# Patient Record
Sex: MALE | Race: WHITE | NOT HISPANIC OR LATINO | Employment: OTHER | URBAN - METROPOLITAN AREA
[De-identification: names, ages, dates, MRNs, and addresses within clinical notes are randomized per-mention and may not be internally consistent; named-entity substitution may affect disease eponyms.]

---

## 2017-02-03 ENCOUNTER — PATIENT OUTREACH (OUTPATIENT)
Dept: HEALTH INFORMATION MANAGEMENT | Facility: OTHER | Age: 72
End: 2017-02-03

## 2017-02-04 NOTE — PROGRESS NOTES
2/3/17  -    Outcome:  CONFIRMED WEB IZ.  COMPLETED NEW MEMBER.      Annual Wellness Visit Scheduling  Scheduling Status: Scheduled    Care Gap Scheduling (Attempt to Schedule EACH Overdue Care Gap!)  Health Maintenance Due   Topic Date Due   • IMM DTaP/Tdap/Td Vaccine (1 - Tdap)    • IMM PNEUMOCOCCAL 65+ (ADULT) LOW/MEDIUM RISK SERIES (1 of 2 - PCV13) Pt wants vaccines but would like to speak  with the HC first.         Softfront Activation:  Sent Activation Code

## 2017-02-28 ENCOUNTER — OFFICE VISIT (OUTPATIENT)
Dept: MEDICAL GROUP | Facility: MEDICAL CENTER | Age: 72
End: 2017-02-28
Payer: MEDICARE

## 2017-02-28 VITALS
SYSTOLIC BLOOD PRESSURE: 114 MMHG | DIASTOLIC BLOOD PRESSURE: 84 MMHG | OXYGEN SATURATION: 97 % | HEART RATE: 82 BPM | RESPIRATION RATE: 14 BRPM | HEIGHT: 67 IN | WEIGHT: 187.61 LBS | TEMPERATURE: 97.8 F | BODY MASS INDEX: 29.45 KG/M2

## 2017-02-28 DIAGNOSIS — Z85.46 PERSONAL HISTORY OF PROSTATE CANCER: ICD-10-CM

## 2017-02-28 DIAGNOSIS — M51.9 LUMBAR DISC DISEASE: ICD-10-CM

## 2017-02-28 DIAGNOSIS — R73.02 IGT (IMPAIRED GLUCOSE TOLERANCE): ICD-10-CM

## 2017-02-28 DIAGNOSIS — R60.0 LOCALIZED EDEMA: ICD-10-CM

## 2017-02-28 DIAGNOSIS — Z00.00 HEALTH CARE MAINTENANCE: ICD-10-CM

## 2017-02-28 DIAGNOSIS — Z82.49 FAMILY HISTORY OF CORONARY ARTERY DISEASE: ICD-10-CM

## 2017-02-28 PROCEDURE — 1101F PT FALLS ASSESS-DOCD LE1/YR: CPT | Performed by: FAMILY MEDICINE

## 2017-02-28 PROCEDURE — G8482 FLU IMMUNIZE ORDER/ADMIN: HCPCS | Performed by: FAMILY MEDICINE

## 2017-02-28 PROCEDURE — G8420 CALC BMI NORM PARAMETERS: HCPCS | Performed by: FAMILY MEDICINE

## 2017-02-28 PROCEDURE — 3017F COLORECTAL CA SCREEN DOC REV: CPT | Performed by: FAMILY MEDICINE

## 2017-02-28 PROCEDURE — 4040F PNEUMOC VAC/ADMIN/RCVD: CPT | Mod: 8P | Performed by: FAMILY MEDICINE

## 2017-02-28 PROCEDURE — 4004F PT TOBACCO SCREEN RCVD TLK: CPT | Performed by: FAMILY MEDICINE

## 2017-02-28 PROCEDURE — 99204 OFFICE O/P NEW MOD 45 MIN: CPT | Mod: 25 | Performed by: FAMILY MEDICINE

## 2017-02-28 RX ORDER — POTASSIUM CHLORIDE 600 MG/1
8 CAPSULE, EXTENDED RELEASE ORAL 2 TIMES DAILY
Qty: 180 CAP | Refills: 3 | Status: SHIPPED | OUTPATIENT
Start: 2017-02-28 | End: 2018-02-16 | Stop reason: SDUPTHER

## 2017-02-28 RX ORDER — FUROSEMIDE 20 MG/1
20 TABLET ORAL
Qty: 90 TAB | Refills: 3 | Status: SHIPPED | OUTPATIENT
Start: 2017-02-28 | End: 2018-02-16 | Stop reason: SDUPTHER

## 2017-02-28 NOTE — MR AVS SNAPSHOT
"        Primo Abarca   2017 10:20 AM   Office Visit   MRN: [id number]    Department:  [address] [place] [address]   Dept Phone:  [phone]    Description:  Male : 1945   Provider:  Skylar Evans M.D.           Reason for Visit     Establish Care med refills      Allergies as of 2017     Allergen Noted Reactions    Pcn [Penicillins] 2013         You were diagnosed with     Localized edema   [2017]       Personal history of prostate cancer   [475013]       IGT (impaired glucose tolerance)   [234001]       Lumbar disc disease   [462199]       Health care maintenance   [783725]       Family history of coronary artery disease   [606338]         Vital Signs     Blood Pressure Pulse Temperature Respirations Height Weight    114/84 mmHg 82 36.6 °C (97.8 °F) 14 1.702 m (5' 7\") 85.1 kg (187 lb 9.8 oz)    Body Mass Index Oxygen Saturation Smoking Status             29.38 kg/m2 97% Current Every Day Smoker         Basic Information     Date Of Birth Sex Race Ethnicity Preferred Language    1945 Male White Non- English      Your appointments     2017  2:00 PM   ANNUAL WELLNESS with Skylar Evans M.D., MATTHEW [facility]    [place] [address] [place] [facility])    [address] [place] [address] [address] [place] [id number]   [phone]              Problem List              ICD-10-CM Priority Class Noted - Resolved    Personal history of prostate cancer Z85.46   2013 - Present    Edema R60.9   3/31/2015 - Present    Lumbar disc disease M51.9   3/31/2015 - Present    Impaired fasting glucose R73.01   2015 - Present    Family history of coronary artery disease Z82.49   2015 - Present      Health Maintenance        Date Due Completion Dates    IMM DTaP/Tdap/Td Vaccine (1 - Tdap) 10/10/1962 ---    IMM PNEUMOCOCCAL 65+ (ADULT) LOW/MEDIUM RISK SERIES (1 of 2 - PCV13) 10/10/2008 ---    COLONOSCOPY 5/3/2021 5/3/2011 (Done)    Override on 5/3/2011: Done            "   Current Immunizations     Influenza Vaccine Adult HD 10/17/2016  2:59 PM, 11/6/2015    Influenza Vaccine Quad Inj (Pf) 10/1/2014, 11/16/2012    Influenza Vaccine Quad Inj (Preserved) 10/1/2013, 10/18/2011, 10/28/2010    SHINGLES VACCINE 8/1/2013      Below and/or attached are the medications your provider expects you to take. Review all of your home medications and newly ordered medications with your provider and/or pharmacist. Follow medication instructions as directed by your provider and/or pharmacist. Please keep your medication list with you and share with your provider. Update the information when medications are discontinued, doses are changed, or new medications (including over-the-counter products) are added; and carry medication information at all times in the event of emergency situations     Allergies:  PCN - (reactions not documented)               Medications  Valid as of: February 28, 2017 - 10:51 AM    Generic Name Brand Name Tablet Size Instructions for use    Cholecalciferol (Tab) vitamin D 2000 UNIT Take  by mouth every day.        Furosemide (Tab) LASIX 20 MG Take 1 Tab by mouth every day.        Oxycodone-Acetaminophen (Tab) PERCOCET 5-325 MG Take 1 Tab by mouth 4 times a day as needed.        Potassium Chloride (Cap CR) MICRO-K 8 MEQ Take 1 Cap by mouth 2 Times a Day.        Sulfamethoxazole-Trimethoprim (Tab) BACTRIM -160 MG Take 1 Tab by mouth 2 times a day.        .                 Medicines prescribed today were sent to:     [facility] [id number] [place] [address] [address] [facility] [address] [facility] [id number]    Phone: [phone] Fax: [phone]    Open 24 Hours?: No      Medication refill instructions:       If your prescription bottle indicates you have medication refills left, it is not necessary to call your provider’s office. Please contact your pharmacy and they will refill your medication.    If your prescription bottle indicates  you do not have any refills left, you may request refills at any time through one of the following ways: The online Cellvine system (except Urgent Care), by calling your provider’s office, or by asking your pharmacy to contact your provider’s office with a refill request. Medication refills are processed only during regular business hours and may not be available until the next business day. Your provider may request additional information or to have a follow-up visit with you prior to refilling your medication.   *Please Note: Medication refills are assigned a new Rx number when refilled electronically. Your pharmacy may indicate that no refills were authorized even though a new prescription for the same medication is available at the pharmacy. Please request the medicine by name with the pharmacy before contacting your provider for a refill.        Your To Do List     Future Labs/Procedures Complete By Expires    CBC WITHOUT DIFFERENTIAL  As directed 8/31/2017    COMP METABOLIC PANEL  As directed 2/28/2018    TSH  As directed 3/1/2018         Cellvine Access Code: [email or web address]  Expires: 3/5/2017  4:29 PM    Cellvine  A secure, online tool to manage your health information     [facility]’s Cellvine® is a secure, online tool that connects you to your personalized health information from the privacy of your home -- day or night - making it very easy for you to manage your healthcare. Once the activation process is completed, you can even access your medical information using the Cellvine mei, which is available for free in the Apple Mei store or Google Play store.     Cellvine provides the following levels of access (as shown below):   My Chart Features   [facility]  Specialists [facility]  Urgent  Care Non-[facility]  Primary Care  Doctor   Email your healthcare team securely and privately 24/7 X X X    Manage appointments: schedule your next appointment; view details of past/upcoming appointments X       Request prescription refills. X      View recent personal medical records, including lab and immunizations X X X X   View health record, including health history, allergies, medications X X X X   Read reports about your outpatient visits, procedures, consult and ER notes X X X X   See your discharge summary, which is a recap of your hospital and/or ER visit that includes your diagnosis, lab results, and care plan. X X       How to register for [facility] [employer]:  1. Go to  [email or web address].  2. Click on the Sign Up Now box, which takes you to the New Member Sign Up page. You will need to provide the following information:  a. Enter your [facility] [facility] Access Code exactly as it appears at the top of this page. (You will not need to use this code after you’ve completed the sign-up process. If you do not sign up before the expiration date, you must request a new code.)   b. Enter your date of birth.   c. Enter your home email address.   d. Click Submit, and follow the next screen’s instructions.  3. Create a [employer] Group ID. This will be your [employer] login ID and cannot be changed, so think of one that is secure and easy to remember.  4. Create a [employer] password. You can change your password at any time.  5. Enter your Password Reset Question and Answer. This can be used at a later time if you forget your password.   6. Enter your e-mail address. This allows you to receive e-mail notifications when new information is available in [employer].  7. Click Sign Up. You can now view your health information.    For assistance activating your [employer] account, call [phone]

## 2017-02-28 NOTE — PROGRESS NOTES
CC: Establish a new PCP    HPI:  Primo presents today to establish a new primary care relationship. Was a patient of Dr Chávez.    Lives alone, active with all ADLs. Has the following medical issues:    Has been having a chronic swelling of both lower extremities, no shortness of breath, denies any h/o heart , or lunf diseases. But has FHx of CAD. Has been using the compression socks.  Has been on lasix, and potassium.    Has history of prostate cancer, was diagnosed 3 years ago, s/p tumour removal (surgery).Has been asymptomatic since then( no hematuria, no urinary symptoms)    Impaired glucose tolerance, his last A1C was 6.3.Denies excessive urination, and drinking of water.Patient is counseled about life style modification ( low carb diet, and age appropriate exercise).    Has h/o lumbar disc disease, has been stable, and mostly asymptomatic. Uses otc pain meds as needed.    Flu vaccine in UTD. Will discuss pneumonia vaccine next visit.        Patient Active Problem List    Diagnosis Date Noted   • Family history of coronary artery disease 05/04/2015   • Impaired fasting glucose 04/09/2015   • Edema 03/31/2015   • Lumbar disc disease 03/31/2015   • Personal history of prostate cancer 07/30/2013       Current Outpatient Prescriptions   Medication Sig Dispense Refill   • furosemide (LASIX) 20 MG Tab Take 1 Tab by mouth every day. 90 Tab 3   • Potassium Chloride CR (MICRO-K) 8 MEQ Cap CR capsule Take 1 Cap by mouth 2 Times a Day. 180 Cap 3   • Cholecalciferol (VITAMIN D) 2000 UNIT TABS Take  by mouth every day.     • sulfamethoxazole-trimethoprim (BACTRIM DS, SEPTRA DS) 800-160 MG per tablet Take 1 Tab by mouth 2 times a day. 20 Tab 0   • oxycodone-acetaminophen (PERCOCET) 5-325 MG TABS Take 1 Tab by mouth 4 times a day as needed. 60 Tab 0     No current facility-administered medications for this visit.         Allergies as of 02/28/2017 - Kenneth as Reviewed 05/20/2016   Allergen Reaction Noted   • Pcn  "[penicillins]  07/23/2013        Social History     Social History   • Marital Status: Single     Spouse Name: N/A   • Number of Children: N/A   • Years of Education: N/A     Occupational History   • Not on file.     Social History Main Topics   • Smoking status: Current Every Day Smoker -- 2.00 packs/day for 45 years     Types: Pipe   • Smokeless tobacco: Never Used   • Alcohol Use: No   • Drug Use: No   • Sexual Activity: No     Other Topics Concern   • Not on file     Social History Narrative       Family History   Problem Relation Age of Onset   • Cancer Mother    • Heart Disease Father    • Cancer Sister        Past Surgical History   Procedure Laterality Date   • Cataract phaco with iol  2006     robert eyes   • Other     • Node dissection  7/30/2013     Performed by Nain Mckeon M.D. at SUR[facility]ERY [facility]   • Prostatectomy, radical retro  7/30/2013     radical prostatectomy performed by Nain Mckeon M.D. at SURGERY [facility]       ROS:  Denies any Headache, Blurred Vision, Confusion Chest pain,  Shortness of breath,  Abdominal pain, Changes of bowel or bladder, Lower ext edema, Fevers, Nights sweats, Weight Changes, Focal weakness or numbness.  All other systems are negative.    /84 mmHg  Pulse 82  Temp(Src) 36.6 °C (97.8 °F)  Resp 14  Ht 1.702 m (5' 7\")  Wt 85.1 kg (187 lb 9.8 oz)  BMI 29.38 kg/m2  SpO2 97%    Physical Exam:  Gen:         Alert and oriented, No apparent distress.  HEENT:   Perrla, TM clear,  Oralpharynx no erythema or exudates.  Neck:       No Jugular venous distension, Lymphadenopathy, Thyromegaly, Bruits.  Lungs:     Clear to auscultation bilaterally  CV:          Regular rate and rhythm. No murmurs, rubs or gallops.  Abd:         Soft non tender, non distended. Normal active bowel sounds. No hepatosplenomegaly, No pulsatile masses.  Ext:          No clubbing, cyanosis, bilateral leg edema, on compression socks.      Assessment and Plan.   71 y.o. male     1. " Localized edema of both lower extremities  Probably venous insufficiency.No h/o heart diease, no SOB.  Continue using the compression socks.  Continue on lasix, and potassium.    - furosemide (LASIX) 20 MG Tab; Take 1 Tab by mouth every day.  Dispense: 90 Tab; Refill: 3  - Potassium Chloride CR (MICRO-K) 8 MEQ Cap CR capsule; Take 1 Cap by mouth 2 Times a Day.  Dispense: 180 Cap; Refill: 3    2. Personal history of prostate cancer  Diagnosed 3 years ago, s/p tumour removal 9 surgery).  Has been asymptomatic.  Will check PSA.    - PROSTATE SPECIFIC AG    3. IGT (impaired glucose tolerance)  Last A1C was 6.3.  Patient is counseled about life style modification ( low carb diet, and age appropriate exercise).    - LIPID PANEL  - COMP METABOLIC PANEL; Future  - CBC WITHOUT DIFFERENTIAL; Future    4. Lumbar disc disease  Pain has been controlled on otc pain meds.    5. Health care maintenance  Flu vaccine in UTD. Will discuss pneumonia vaccine next visit.    6. Family history of coronary artery disease    - LIPID PANEL  - COMP METABOLIC PANEL; Future  - PROSTATE SPECIFIC AG  - CBC WITHOUT DIFFERENTIAL; Future  - TSH; Future

## 2017-03-29 NOTE — PROGRESS NOTES
Attempt #:1    Verify PCP: yes    Communication Preference Obtained: yes     Review Care Team: yes    Annual Wellness Visit Scheduling  1. Scheduling Status:Scheduled          Care Gap Scheduling (Attempt to Schedule EACH Overdue Care Gap!)     Health Maintenance Due   Topic Date Due   • Annual Wellness Visit  SCHEDULED    • IMM DTaP/Tdap/Td Vaccine (1 - Tdap) SCHEDULED   • IMM PNEUMOCOCCAL 65+ (ADULT) LOW/MEDIUM RISK SERIES (1 of 2 - PCV13) SCHEDULED         MyChart Activation: sent activation code  Dobango Mei: no  Virtual Visits: no  Opt In to Text Messages: no

## 2017-11-29 ENCOUNTER — TELEPHONE (OUTPATIENT)
Dept: MEDICAL GROUP | Facility: MEDICAL CENTER | Age: 72
End: 2017-11-29

## 2017-11-29 DIAGNOSIS — D64.9 ANEMIA, UNSPECIFIED TYPE: ICD-10-CM

## 2017-12-13 ENCOUNTER — HOSPITAL ENCOUNTER (OUTPATIENT)
Dept: LAB | Facility: MEDICAL CENTER | Age: 72
End: 2017-12-13
Attending: FAMILY MEDICINE
Payer: MEDICARE

## 2017-12-13 DIAGNOSIS — R73.02 IGT (IMPAIRED GLUCOSE TOLERANCE): ICD-10-CM

## 2017-12-13 DIAGNOSIS — D64.9 ANEMIA, UNSPECIFIED TYPE: ICD-10-CM

## 2017-12-13 DIAGNOSIS — Z82.49 FAMILY HISTORY OF CORONARY ARTERY DISEASE: ICD-10-CM

## 2017-12-13 LAB
ALBUMIN SERPL BCP-MCNC: 3.9 G/DL (ref 3.2–4.9)
ALBUMIN/GLOB SERPL: 1.6 G/DL
ALP SERPL-CCNC: 61 U/L (ref 30–99)
ALT SERPL-CCNC: 14 U/L (ref 2–50)
ANION GAP SERPL CALC-SCNC: 9 MMOL/L (ref 0–11.9)
AST SERPL-CCNC: 15 U/L (ref 12–45)
BASOPHILS # BLD AUTO: 0.6 % (ref 0–1.8)
BASOPHILS # BLD: 0.05 K/UL (ref 0–0.12)
BILIRUB SERPL-MCNC: 0.6 MG/DL (ref 0.1–1.5)
BUN SERPL-MCNC: 19 MG/DL (ref 8–22)
CALCIUM SERPL-MCNC: 8.7 MG/DL (ref 8.5–10.5)
CHLORIDE SERPL-SCNC: 100 MMOL/L (ref 96–112)
CHOLEST SERPL-MCNC: 119 MG/DL (ref 100–199)
CO2 SERPL-SCNC: 25 MMOL/L (ref 20–33)
CREAT SERPL-MCNC: 0.76 MG/DL (ref 0.5–1.4)
EOSINOPHIL # BLD AUTO: 0.06 K/UL (ref 0–0.51)
EOSINOPHIL NFR BLD: 0.7 % (ref 0–6.9)
ERYTHROCYTE [DISTWIDTH] IN BLOOD BY AUTOMATED COUNT: 41.4 FL (ref 35.9–50)
GFR SERPL CREATININE-BSD FRML MDRD: >60 ML/MIN/1.73 M 2
GLOBULIN SER CALC-MCNC: 2.5 G/DL (ref 1.9–3.5)
GLUCOSE SERPL-MCNC: 98 MG/DL (ref 65–99)
HCT VFR BLD AUTO: 45.4 % (ref 42–52)
HDLC SERPL-MCNC: 47 MG/DL
HGB BLD-MCNC: 15.1 G/DL (ref 14–18)
IMM GRANULOCYTES # BLD AUTO: 0.09 K/UL (ref 0–0.11)
IMM GRANULOCYTES NFR BLD AUTO: 1.1 % (ref 0–0.9)
LDLC SERPL CALC-MCNC: 60 MG/DL
LYMPHOCYTES # BLD AUTO: 1.02 K/UL (ref 1–4.8)
LYMPHOCYTES NFR BLD: 12.6 % (ref 22–41)
MCH RBC QN AUTO: 30.7 PG (ref 27–33)
MCHC RBC AUTO-ENTMCNC: 33.3 G/DL (ref 33.7–35.3)
MCV RBC AUTO: 92.3 FL (ref 81.4–97.8)
MONOCYTES # BLD AUTO: 0.74 K/UL (ref 0–0.85)
MONOCYTES NFR BLD AUTO: 9.2 % (ref 0–13.4)
NEUTROPHILS # BLD AUTO: 6.12 K/UL (ref 1.82–7.42)
NEUTROPHILS NFR BLD: 75.8 % (ref 44–72)
NRBC # BLD AUTO: 0 K/UL
NRBC BLD AUTO-RTO: 0 /100 WBC
PLATELET # BLD AUTO: 205 K/UL (ref 164–446)
PMV BLD AUTO: 11.7 FL (ref 9–12.9)
POTASSIUM SERPL-SCNC: 3.9 MMOL/L (ref 3.6–5.5)
PROT SERPL-MCNC: 6.4 G/DL (ref 6–8.2)
PSA SERPL-MCNC: <0.01 NG/ML (ref 0–4)
RBC # BLD AUTO: 4.92 M/UL (ref 4.7–6.1)
SODIUM SERPL-SCNC: 134 MMOL/L (ref 135–145)
TRIGL SERPL-MCNC: 61 MG/DL (ref 0–149)
TSH SERPL DL<=0.005 MIU/L-ACNC: 2.04 UIU/ML (ref 0.3–3.7)
WBC # BLD AUTO: 8.1 K/UL (ref 4.8–10.8)

## 2017-12-13 PROCEDURE — 80061 LIPID PANEL: CPT

## 2017-12-13 PROCEDURE — 85025 COMPLETE CBC W/AUTO DIFF WBC: CPT

## 2017-12-13 PROCEDURE — 84153 ASSAY OF PSA TOTAL: CPT

## 2017-12-13 PROCEDURE — 80053 COMPREHEN METABOLIC PANEL: CPT

## 2017-12-13 PROCEDURE — 84443 ASSAY THYROID STIM HORMONE: CPT

## 2017-12-13 PROCEDURE — 36415 COLL VENOUS BLD VENIPUNCTURE: CPT

## 2017-12-29 ENCOUNTER — TELEPHONE (OUTPATIENT)
Dept: MEDICAL GROUP | Facility: MEDICAL CENTER | Age: 72
End: 2017-12-29

## 2017-12-29 NOTE — TELEPHONE ENCOUNTER
PVP WITH OUTREACH  Future Appointments       Provider Department Center    1/2/2018 11:00 AM Skylar Evans M.D.; [place] [facility]  [facility] [address]          ORIGINAL PVP AWV // PVP WITHOUT OUTREACH  ANNUAL WELLNESS VISIT PRE-VISIT PLANNING     1.  Reviewed note from last office visit with PCP: YES 02/28/17    2.  If any orders were placed at last visit, do we have Results/Consult Notes?        •  Labs - Labs ordered, completed on 12/13/17 and results are in chart.       •  Imaging - Imaging was not ordered at last office visit.       •  Referrals - No referrals were ordered at last office visit.    3.  Immunizations were updated in [employer] using WebIZ?: Yes       •  WebIZ Recommendations: PREVNAR (PCV13)  and TDAP       •  Is patient due for Tdap? YES. Patient was notified of copay/out of pocket cost.       •  Is patient due for Shingles? NO     4.  Patient is due for the following Health Maintenance Topics:   Health Maintenance Due   Topic Date Due   • Annual Wellness Visit  1945   • IMM DTaP/Tdap/Td Vaccine (1 - Tdap) 10/10/1962       - Patient already has appointment scheduled for Immunizations: TDAP.      5.  Reviewed/Updated the following with patient:       •   Preferred Pharmacy? YES       •   Preferred Lab? YES       •   Preferred Communication? YES       •   Allergies? YES       •   Medications? YES. Was Abstract Encounter opened and chart updated? YES       •   Social History? YES. Was Abstract Encounter opened and chart updated? YES       •   Family History (document living status of immediate family members and if + hx of cancer, diabetes, hypertension, hyperlipidemia, heart attack, stroke) YES. Was Abstract Encounter opened and chart updated? YES    6.  Care Team Updated:       •   DME Company (gait device, O2, CPAP, etc.): YES       •   Other Specialists (eye doctor, derm, GYN, cardiology, endo, etc): YES    7.  Patient has the following Care Path diagnoses on  Problem List:  NONE    8.  Specialty Comments was updated with diagnosis information provided by SCP: YES    9.  Patient was advised: “This is a free wellness visit. The provider will screen for medical conditions to help you stay healthy. If you have other concerns to address you may be asked to discuss these at a separate visit or there may be an additional fee.”     6.  Patient was informed to arrive 15 min prior to their scheduled appointment and bring in their medication bottles.

## 2018-01-02 ENCOUNTER — OFFICE VISIT (OUTPATIENT)
Dept: MEDICAL GROUP | Facility: MEDICAL CENTER | Age: 73
End: 2018-01-02
Payer: MEDICARE

## 2018-01-02 VITALS
HEART RATE: 68 BPM | SYSTOLIC BLOOD PRESSURE: 122 MMHG | BODY MASS INDEX: 29.25 KG/M2 | HEIGHT: 66 IN | WEIGHT: 182 LBS | TEMPERATURE: 97.8 F | RESPIRATION RATE: 16 BRPM | OXYGEN SATURATION: 97 % | DIASTOLIC BLOOD PRESSURE: 70 MMHG

## 2018-01-02 DIAGNOSIS — R73.01 IMPAIRED FASTING GLUCOSE: ICD-10-CM

## 2018-01-02 DIAGNOSIS — Z85.46 PERSONAL HISTORY OF PROSTATE CANCER: ICD-10-CM

## 2018-01-02 DIAGNOSIS — M51.9 LUMBAR DISC DISEASE: ICD-10-CM

## 2018-01-02 DIAGNOSIS — Z23 NEED FOR TDAP VACCINATION: ICD-10-CM

## 2018-01-02 DIAGNOSIS — Z00.00 MEDICARE ANNUAL WELLNESS VISIT, INITIAL: ICD-10-CM

## 2018-01-02 DIAGNOSIS — M79.89 LEG SWELLING: ICD-10-CM

## 2018-01-02 DIAGNOSIS — Z91.81 RISK FOR FALLS: ICD-10-CM

## 2018-01-02 DIAGNOSIS — R25.1 TREMORS OF NERVOUS SYSTEM: ICD-10-CM

## 2018-01-02 PROCEDURE — 99999 PR NO CHARGE: CPT | Performed by: FAMILY MEDICINE

## 2018-01-02 PROCEDURE — 90715 TDAP VACCINE 7 YRS/> IM: CPT | Performed by: FAMILY MEDICINE

## 2018-01-02 PROCEDURE — 90471 IMMUNIZATION ADMIN: CPT | Performed by: FAMILY MEDICINE

## 2018-01-02 PROCEDURE — G0439 PPPS, SUBSEQ VISIT: HCPCS | Mod: 25 | Performed by: FAMILY MEDICINE

## 2018-01-02 ASSESSMENT — PATIENT HEALTH QUESTIONNAIRE - PHQ9: CLINICAL INTERPRETATION OF PHQ2 SCORE: 0

## 2018-01-02 ASSESSMENT — PAIN SCALES - GENERAL: PAINLEVEL: NO PAIN

## 2018-01-02 NOTE — PROGRESS NOTES
Chief Complaint   Patient presents with   • Annual Wellness Visit         HPI:  Primo is a 72 y.o. here for Medicare Annual Wellness Visit       Impaired fasting glucose  Her A1C one year ago was 6.3. However most recent Blood glucose was normal. Patient has been eating low carb diet. Has been asymptomatic,    Lumbar disc disease  Tolerable, otc pain medication has been working.    Personal history of prostate cancer  Underwent prostatectomy 5 yrs ago. In remission, asymptomatic.. Most recent PSA was normal.    Leg swelling  Has been having a lymphedema as a result of the radiacal prostatectomy for prostate cancer.Has been on lasix since then. .    Tremors of nervous system  New symptoms. Started a year ago. It has been associated with low voice and slowness of movements.    Risk for falls  Has slowness of movements, possibly due to parkinson disease. Denies falls, and gait problem. He does not want to use cane or a walker.    Medicare annual wellness visit, initial  Preventive, and chronic medical issues were reviewed.    Due for the Tdap.      Patient Active Problem List    Diagnosis Date Noted   • Family history of coronary artery disease 05/04/2015   • Impaired fasting glucose 04/09/2015   • Edema 03/31/2015   • Lumbar disc disease 03/31/2015   • Personal history of prostate cancer 07/30/2013       Current Outpatient Prescriptions   Medication Sig Dispense Refill   • furosemide (LASIX) 20 MG Tab Take 1 Tab by mouth every day. 90 Tab 3   • Potassium Chloride CR (MICRO-K) 8 MEQ Cap CR capsule Take 1 Cap by mouth 2 Times a Day. 180 Cap 3   • sulfamethoxazole-trimethoprim (BACTRIM DS, SEPTRA DS) 800-160 MG per tablet Take 1 Tab by mouth 2 times a day. (Patient not taking: Reported on 12/29/2017) 20 Tab 0   • Cholecalciferol (VITAMIN D) 2000 UNIT TABS Take 4,000 Units by mouth every day.     • oxycodone-acetaminophen (PERCOCET) 5-325 MG TABS Take 1 Tab by mouth 4 times a day as needed. (Patient not taking: Reported  on 12/29/2017) 60 Tab 0     No current facility-administered medications for this visit.         The patient is not taking Oxycodone and Bactrim medication(s) due to:  other: oxy was for after sugery and PT does not remember the Bactrim.  Current supplements as per medication list.     Allergies: Pcn [penicillins]    Current social contact/activities: Visit with family and friends, dinner with with old coworker occasionally      Is patient current with immunizations? No, due for TDAP. Patient is interested in receiving TDAP today.    He  reports that he has been smoking Pipe.  He has a 100.00 pack-year smoking history. He has never used smokeless tobacco. He reports that he does not drink alcohol or use drugs.  Ready to quit: Not Answered  Counseling given: Not Answered        DPA/Advanced directive: Patient does not have an Advanced Directive.  A packet and workshop information was given on Advanced Directives.    ROS:    Gait: Uses no assistive device   Ostomy: no   Other tubes: no   Amputations: no   Chronic oxygen use no   Last eye exam 6/2017   Wears hearing aids: no   : Reports urinary leakage during the last 6 months that has somewhat interfered with their daily activities or sleep.     Screening:        Depression Screening    Little interest or pleasure in doing things?  0 - not at all  Feeling down, depressed, or hopeless? 0 - not at all  Patient Health Questionnaire Score: 0    If depressive symptoms identified deferred to follow up visit unless specifically addressed in assessment and plan.    Interpretation of PHQ-9 Total Score   Score Severity   1-4 No Depression   5-9 Mild Depression   10-14 Moderate Depression   15-19 Moderately Severe Depression   20-27 Severe Depression    Screening for Cognitive Impairment    Three Minute Recall (apple, watch, aldo)  2/3 Village, Kitchen, Baby  Draw clock face with all 12 numbers set to the hand to show 40 minutes past 3 o'clock  pass 4/5  If cognitive concerns  identified, deferred for follow up unless specifically addressed in assessment and plan.    Fall Risk Assessment    Has the patient had two or more falls in the last year or any fall with injury in the last year?  Yes  If fall risk identified, deferred for follow up unless specifically addressed in assessment and plan.    Safety Assessment    Throw rugs on floor.  No  Handrails on all stairs.  Yes  Good lighting in all hallways.  Yes  Difficulty hearing.  No  Patient counseled about all safety risks that were identified.    Functional Assessment ADLs    Are there any barriers preventing you from cooking for yourself or meeting nutritional needs?  No.    Are there any barriers preventing you from driving safely or obtaining transportation?  No.    Are there any barriers preventing you from using a telephone or calling for help?  No.    Are there any barriers preventing you from shopping?  No.    Are there any barriers preventing you from taking care of your own finances?  No.    Are there any barriers preventing you from managing your medications?  No.    Are you currently engaging any exercise or physical activity?  Yes.  Walking 1-3 miles 3x per wk    Health Maintenance Summary                Annual Wellness Visit Overdue 1945     IMM DTaP/Tdap/Td Vaccine Overdue 10/10/1962     IMM PNEUMOCOCCAL 65+ (ADULT) LOW/MEDIUM RISK SERIES Next Due 10/5/2018      Done 10/5/2017 Imm Admin: Pneumococcal Conjugate Vaccine (Prevnar/PCV-13)    COLONOSCOPY Next Due 5/3/2021      Done 5/3/2011           Patient Care Team:  Skylar Evans M.D. as PCP - General (Geriatrics)  Nain Mckeon M.D. as Consulting Physician (Urology)    Social History   Substance Use Topics   • Smoking status: Current Every Day Smoker     Packs/day: 2.00     Years: 50.00     Types: Pipe   • Smokeless tobacco: Never Used      Comment: Started smoking at age 23   • Alcohol use No     Family History   Problem Relation Age of Onset   • Breast Cancer  "Mother    • Heart Disease Father      bypass   • Hypertension Father    • Heart Failure Father    • Diabetes Sister    • No Known Problems Brother    • No Known Problems Maternal Grandmother    • Stroke Paternal Grandmother    • No Known Problems Brother    • No Known Problems Brother    • No Known Problems Brother    • No Known Problems Brother      He  has a past medical history of Cancer (CMS-HCC) (2013).   Past Surgical History:   Procedure Laterality Date   • NODE DISSECTION  7/30/2013    Performed by Nain Mckeon M.D. at SURGERY [facility]   • PROSTATECTOMY, RADICAL RETRO  7/30/2013    radical prostatectomy performed by Nain Mckeon M.D. at SURGERY [facility]   • CATARACT PHACO WITH IOL  2006    robert eyes   • OTHER         Exam:     Blood pressure 122/70, pulse 68, temperature 36.6 °C (97.8 °F), resp. rate 16, height 1.67 m (5' 5.75\"), weight 82.6 kg (182 lb), SpO2 97 %. Body mass index is 29.6 kg/m².    Hearing , guzman  Dentition , good.  Alert, oriented in no acute distress.  Eye contact is good, speech goal directed, affect calm  Tremors: has a pill rolling tremors pf the right hand, and hypophonia, no stiffness, or rigidity.    Assessment and Plan. The following treatment and monitoring plan is recommended:    72 y.o. male     1. Need for Tdap vaccination  Given today.    - TDAP VACCINE =>8YO IM; Future  - Initial Wellness Visit - Includes PPPS ()    2. Impaired fasting glucose  A1C one year ago was 6.3. However most recent Blood glucose was normal. Patient has been eating low carb diet.    - Initial Wellness Visit - Includes PPPS ()    3. Lumbar disc disease  Tolerable, otc pain medication has been working.    - Initial Wellness Visit - Includes PPPS ()    4. Personal history of prostate cancer  S/P prostatectomy 5 yrs ago. In remission. Most recent PSA was normal.    - Initial Wellness Visit - Includes PPPS ()    5. Leg swelling  Lymphedema as a result of the prostatectomy " ( radiacal prostatectomy).  Has been on lasix .    - Initial Wellness Visit - Includes PPPS ()    6. Tremors of nervous system  For one year, associated with low voice and slowness of movements.  Possibly parkinson disease.  Will send to neurology.    - Initial Wellness Visit - Includes PPPS ()  - REFERRAL TO NEUROLOGY    7. Risk for falls  Has slowness of movements, possibly due to parkinson disease. Denies falls, and gait problem. He does not want to use cane or a walker.    - Patient identified as fall risk.  Appropriate orders and counseling given.    8. Medicare annual wellness visit, initial  Preventive, and chronic medical issues were reviewed.    - Initial Wellness Visit - Includes PPPS ()      Services suggested:   Health Care Screening recommendations as per orders if indicated.  Referrals offered: PT/OT/Nutrition counseling/Behavioral Health/Smoking cessation as per orders if indicated.    Discussion today about general wellness and lifestyle habits:    · Prevent falls and reduce trip hazards; Cautioned about securing or removing rugs.  · Have a working fire alarm and carbon monoxide detector;   · Engage in regular physical activity and social activities       Follow-up: No Follow-up on file.

## 2018-02-16 DIAGNOSIS — R60.0 LOCALIZED EDEMA: ICD-10-CM

## 2018-02-16 RX ORDER — FUROSEMIDE 20 MG/1
TABLET ORAL
Qty: 90 TAB | Refills: 0 | Status: SHIPPED | OUTPATIENT
Start: 2018-02-16 | End: 2018-05-14 | Stop reason: SDUPTHER

## 2018-02-16 RX ORDER — POTASSIUM CHLORIDE 600 MG/1
CAPSULE, EXTENDED RELEASE ORAL
Qty: 180 CAP | Refills: 0 | Status: SHIPPED | OUTPATIENT
Start: 2018-02-16 | End: 2018-05-14 | Stop reason: SDUPTHER

## 2018-03-01 ENCOUNTER — PATIENT OUTREACH (OUTPATIENT)
Dept: HEALTH INFORMATION MANAGEMENT | Facility: OTHER | Age: 73
End: 2018-03-01

## 2018-03-01 NOTE — PROGRESS NOTES
Attempt #:1    WebIZ Checked & Epic Updated: no  HealthConnect Verified: no  Verify PCP: yes    Communication Preference Obtained: yes     Review Care Team: yes    Annual Wellness Visit Scheduling  1. Scheduling Status:Not Scheduled. Patient states they are not interested

## 2018-05-14 DIAGNOSIS — R60.0 LOCALIZED EDEMA: ICD-10-CM

## 2018-05-14 RX ORDER — FUROSEMIDE 20 MG/1
TABLET ORAL
Qty: 90 TAB | Refills: 0 | Status: SHIPPED | OUTPATIENT
Start: 2018-05-14 | End: 2018-08-10 | Stop reason: SDUPTHER

## 2018-05-14 RX ORDER — POTASSIUM CHLORIDE 600 MG/1
CAPSULE, EXTENDED RELEASE ORAL
Qty: 180 CAP | Refills: 0 | Status: SHIPPED | OUTPATIENT
Start: 2018-05-14 | End: 2019-03-15 | Stop reason: SDUPTHER

## 2018-05-16 ENCOUNTER — OFFICE VISIT (OUTPATIENT)
Dept: NEUROLOGY | Facility: MEDICAL CENTER | Age: 73
End: 2018-05-16
Payer: MEDICARE

## 2018-05-16 VITALS
OXYGEN SATURATION: 96 % | DIASTOLIC BLOOD PRESSURE: 80 MMHG | BODY MASS INDEX: 29.66 KG/M2 | RESPIRATION RATE: 16 BRPM | HEART RATE: 67 BPM | TEMPERATURE: 98 F | HEIGHT: 65 IN | WEIGHT: 178.02 LBS | SYSTOLIC BLOOD PRESSURE: 160 MMHG

## 2018-05-16 DIAGNOSIS — G20.A1 PARKINSON DISEASE: ICD-10-CM

## 2018-05-16 PROCEDURE — 99214 OFFICE O/P EST MOD 30 MIN: CPT | Performed by: PSYCHIATRY & NEUROLOGY

## 2018-05-16 NOTE — PROGRESS NOTES
NEUROLOGY NOTE    Referring Physician  Skylar Evans M.D.      CHIEF COMPLAINT:  Tremor noticed by his PCP  Chief Complaint   Patient presents with   • Establish Care     Tremors       PRESENT ILLNESS:   Tremor noticed by his PCP    Parkinson disease,   Drooling for 4+ years  Tremor noticed by PCP last year  The patient lives alone      Resting tremor - both sides--R>L  Rigidity both sides  Bradykinesia both sides  Postural reflex : not in big trouble YET    Masked face      PAST MEDICAL HISTORY:  Past Medical History:   Diagnosis Date   • Cancer (CMS-HCC) (HCC) 2013    prostate   • Cataract        PAST SURGICAL HISTORY:  Past Surgical History:   Procedure Laterality Date   • NODE DISSECTION  7/30/2013    Performed by Nain Mckeno M.D. at SURGERY [facility]   • PROSTATECTOMY, RADICAL RETRO  7/30/2013    radical prostatectomy performed by Nain Mckeon M.D. at SURGERY [facility]   • CATARACT PHACO WITH IOL  2006    robert eyes   • OTHER         FAMILY HISTORY:  Family History   Problem Relation Age of Onset   • Breast Cancer Mother    • Heart Disease Father      bypass   • Hypertension Father    • Heart Failure Father    • Diabetes Sister    • No Known Problems Brother    • No Known Problems Maternal Grandmother    • Stroke Paternal Grandmother    • No Known Problems Brother    • No Known Problems Brother    • No Known Problems Brother    • No Known Problems Brother        SOCIAL HISTORY:  Social History     Social History   • Marital status: Single     Spouse name: N/A   • Number of children: N/A   • Years of education: N/A     Occupational History   • Not on file.     Social History Main Topics   • Smoking status: Current Every Day Smoker     Packs/day: 2.00     Years: 50.00     Types: Pipe   • Smokeless tobacco: Never Used      Comment: Started smoking at age 23   • Alcohol use No   • Drug use: No   • Sexual activity: No     Other Topics Concern   • Not on file     Social History Narrative   • No  "narrative on file     ALLERGIES:  Allergies   Allergen Reactions   • Pcn [Penicillins]      TOBHX  History   Smoking Status   • Current Every Day Smoker   • Packs/day: 2.00   • Years: 50.00   • Types: Pipe   Smokeless Tobacco   • Never Used     Comment: Started smoking at age 23     ALCHX  History   Alcohol Use No     DRUGHX  History   Drug Use No           MEDICATIONS:  Current Outpatient Prescriptions   Medication   • furosemide (LASIX) 20 MG Tab   • Potassium Chloride CR (MICRO-K) 8 MEQ Cap CR capsule   • Cholecalciferol (VITAMIN D) 2000 UNIT TABS   • sulfamethoxazole-trimethoprim (BACTRIM DS, SEPTRA DS) 800-160 MG per tablet   • oxycodone-acetaminophen (PERCOCET) 5-325 MG TABS     No current facility-administered medications for this visit.        REVIEW OF SYSTEM:    Constitutional: Denies fevers, Denies weight changes   Eyes: Denies changes in vision, no eye pain   Ears/Nose/Throat/Mouth: Denies nasal congestion or sore throat   Cardiovascular: Denies chest pain or palpitations   Respiratory: Denies SOB.   Gastrointestinal/Hepatic: Denies abdominal pain, nausea, vomiting, diarrhea, constipation or GI bleeding   Genitourinary: Denies bladder dysfunction, dysuria or frequency   Musculoskeletal/Rheum: Denies joint pain and swelling   Skin/Breast: Denies rash, denies breast lumps or discharge   Neurological: parkinson disease stage II- III  Psychiatric: denies mood disorder   Endocrine: denies hx of diabetes or thyroid dysfunction   Heme/Oncology/Lymph Nodes: Denies enlarged lymph nodes, denies brusing or known bleeding disorder   Allergic/Immunologic: Denies hx of allergies         PHYSICAL AND NEUROLOGICAL EXMAINATIONS:  VITAL SIGNS: /80   Pulse 67   Temp 36.7 °C (98 °F)   Resp 16   Ht 1.651 m (5' 5\")   Wt 80.7 kg (178 lb 0.3 oz)   SpO2 96%   BMI 29.62 kg/m²   CURRENT WEIGHT: [unfilled]  BMI: Body mass index is 29.62 kg/m².  PREVIOUS WEIGHTS:  Wt Readings from Last 25 Encounters:   05/16/18 80.7 kg " (178 lb 0.3 oz)   01/02/18 82.6 kg (182 lb)   02/28/17 85.1 kg (187 lb 9.8 oz)   05/17/16 82.6 kg (182 lb)   05/04/15 85.3 kg (188 lb)   07/30/13 85.3 kg (188 lb)       General appearance of patient: WDWN(+) NAD(+)    EYES  o Fundus : Papilledem(-) Exudates(-) Hemorrhage(-)  Nervous System  Orientation to time, place and person(+)  Memory normal(-)  Language: aphasia(-)  Knowledge: past(+) Current(+)  Attention(+)  Cranial Nerves  • Nerve 2: intact  • Nerve 3,4,6: intact  • Nerve 5 : intact  • Nerve 7: intact  • Nerve 8: intact  • Nerve 9 & 10: intact  • Nerve 11: intact  • Nerve 12: intact  Muscle Power and muscle tone: symmetric, normal in upper and lower  Sensory System: Pin sensation intact(+)  Reflexes: symmetric throughout  Cerebellar Function FNP normal   Gait : still able to walk without help  Heart and Vascular  Peripheral Vasucular system : Edema (-) Swelling(-)  RHB, Breathing sound clear  abdomen bowel sound normoactive  Extremities freely moveable  Joints no contracture       NEUROIMAGING: I reviewed the MRI/CT of brain       LAB:        Retired       IMPRESSION:    1. Parkinson Disease-- stage II- Stage III  2. Prostate Carcinoma status post surgery  3. 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 Cognitive Decline    PLAN/RECOMMENDATIONS:    Sinemet 25/100 half tab  3 times per day for 2 weeks, then Sinemet 25/100 one tab three times per day  Then up to sinemet 25/100mg 2 tablets three times per day since the second month    If any side effects, please notify us, if not helpful, also notify us  Call us in one month or any other side effects-- we will need to increase Sinemet further on the phone       The patient lives alone and do not have good social support      SIGNATURE:  Dudley Sotelo    CC:  Skylar Evans M.D.

## 2018-05-16 NOTE — PATIENT INSTRUCTIONS
IMPRESSION:    1. Parkinson Disease-- stage II- Stage III  2. Prostate Carcinoma status post surgery  3. 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 Cognitive Decline    PLAN/RECOMMENDATIONS:    Sinemet 25/100 half tab  3 times per day for 2 weeks, then Sinemet 25/100 one tab three times per day  Then up to sinemet 25/100mg 2 tablets three times per day since the second month    If any side effects, please notify us, if not helpful, also notify us  Call us in one month or any other side effects-- we will need to increase Sinemet further on the phone       The patient lives alone and do not have good social support      SIGNATURE:  Dudley Sotelo    CC:  Skylar Evans M.D.

## 2018-05-21 ENCOUNTER — OFFICE VISIT (OUTPATIENT)
Dept: MEDICAL GROUP | Facility: MEDICAL CENTER | Age: 73
End: 2018-05-21
Payer: MEDICARE

## 2018-05-21 VITALS
WEIGHT: 180.56 LBS | HEIGHT: 65 IN | RESPIRATION RATE: 16 BRPM | OXYGEN SATURATION: 96 % | HEART RATE: 66 BPM | BODY MASS INDEX: 30.08 KG/M2 | SYSTOLIC BLOOD PRESSURE: 148 MMHG | TEMPERATURE: 98.1 F | DIASTOLIC BLOOD PRESSURE: 80 MMHG

## 2018-05-21 DIAGNOSIS — Z85.46 PERSONAL HISTORY OF PROSTATE CANCER: ICD-10-CM

## 2018-05-21 DIAGNOSIS — R73.01 IMPAIRED FASTING GLUCOSE: ICD-10-CM

## 2018-05-21 DIAGNOSIS — M79.89 LEG SWELLING: ICD-10-CM

## 2018-05-21 DIAGNOSIS — G20.A1 PARKINSON DISEASE: ICD-10-CM

## 2018-05-21 PROCEDURE — 99214 OFFICE O/P EST MOD 30 MIN: CPT | Performed by: FAMILY MEDICINE

## 2018-05-21 NOTE — PROGRESS NOTES
CC: PKD, hyperglycemia, h/o prostate cancer, lymphedema.    HPI:   Primo presents today to discuss the following medical issues:    Parkinson disease (HCC)  Patient has been typically symptomatic ( pill rolling tremors, stooping forward, with shuffling gait, hypophonia, and drooling .Was started on Sinemet( ), eventually will take 2 tablet 3 times a day .    Impaired fasting glucose  His A1C in 5/2016 was 6.3. However hist Blood glucose has been normal. Has been asymptomatic.     Personal history of prostate cancer  He underwent prostatectomy 5 yrs ago. Has been in remission since then, last PSA was normal.     Leg swelling  Has been having a lymphedema as a result of the radiacal prostatectomy for prostate cancer.Has been on lasix since then.       Patient Active Problem List    Diagnosis Date Noted   • Parkinson disease (HCC) 05/16/2018   • Risk for falls 01/02/2018   • Family history of coronary artery disease 05/04/2015   • Impaired fasting glucose 04/09/2015   • Edema 03/31/2015   • Lumbar disc disease 03/31/2015   • Personal history of prostate cancer 07/30/2013       Current Outpatient Prescriptions   Medication Sig Dispense Refill   • carbidopa-levodopa (SINEMET)  MG Tab Take 2 Tabs by mouth 3 times a day. 180 Tab 4   • furosemide (LASIX) 20 MG Tab TAKE 1 TABLET BY MOUTH EVERY DAY 90 Tab 0   • Potassium Chloride CR (MICRO-K) 8 MEQ Cap CR capsule TAKE 1 CAPSULE BY MOUTH TWICE DAILY 180 Cap 0   • Cholecalciferol (VITAMIN D) 2000 UNIT TABS Take 4,000 Units by mouth every day.     • sulfamethoxazole-trimethoprim (BACTRIM DS, SEPTRA DS) 800-160 MG per tablet Take 1 Tab by mouth 2 times a day. (Patient not taking: Reported on 12/29/2017) 20 Tab 0   • oxycodone-acetaminophen (PERCOCET) 5-325 MG TABS Take 1 Tab by mouth 4 times a day as needed. (Patient not taking: Reported on 12/29/2017) 60 Tab 0     No current facility-administered medications for this visit.          Allergies as of 05/21/2018 -  "Reviewed 05/21/2018   Allergen Reaction Noted   • Pcn [penicillins]  07/23/2013        ROS: Denies any chest pain, Shortness of breath, Changes bowel or bladder, Lower extremity edema.    Physical Exam:  /80   Pulse 66   Temp 36.7 °C (98.1 °F)   Resp 16   Ht 1.651 m (5' 5\")   Wt 81.9 kg (180 lb 8.9 oz)   SpO2 96%   BMI 30.05 kg/m²   Gen.: Well-developed, well-nourished, no apparent distress,pleasant and cooperative with the examination  Skin:  Warm and dry with good turgor. No rashes or suspicious lesions in visible areas  HEENT:Sinuses nontender with palpation, TMs clear, nares patent with pink mucosa and clear rhinorrhea,no septal deviation ,polyps or lesions. lips without lesions, oropharynx clear.  Neck: Trachea midline,no masses or adenopathy. No JVD.  Cor: Regular rate and rhythm without murmur, gallop or rub.  Lungs: Respirations unlabored.Clear to auscultation with equal breath sounds bilaterally. No wheezes, rhonchi.  Extremities: No cyanosis, no clubbing , bilateral leg edema.      Assessment and Plan.   72 y.o. male     1. Parkinson disease (HCC)  Diagnosed recently. Symptomatic.  Continue Sinemet as prescribed.    2. Impaired fasting glucose  His A1C in 5/2016 was 6.3.Asymptomatic.  Will repeat A1C.    3. Leg swelling  Lymphedema as a result of the prostatectomy ( radiacal prostatectomy).  Continue on lasix 20 mg daily.    4. Personal history of prostate cancer  S/P prostatectomy 5 yrs ago. In [facility]. PSA has been normal.         "

## 2018-05-31 ENCOUNTER — HOSPITAL ENCOUNTER (OUTPATIENT)
Dept: LAB | Facility: MEDICAL CENTER | Age: 73
End: 2018-05-31
Attending: FAMILY MEDICINE
Payer: MEDICARE

## 2018-05-31 DIAGNOSIS — R73.01 IMPAIRED FASTING GLUCOSE: ICD-10-CM

## 2018-05-31 LAB
EST. AVERAGE GLUCOSE BLD GHB EST-MCNC: 134 MG/DL
HBA1C MFR BLD: 6.3 % (ref 0–5.6)

## 2018-05-31 PROCEDURE — 83036 HEMOGLOBIN GLYCOSYLATED A1C: CPT

## 2018-05-31 PROCEDURE — 36415 COLL VENOUS BLD VENIPUNCTURE: CPT

## 2018-08-10 DIAGNOSIS — R60.0 LOCALIZED EDEMA: ICD-10-CM

## 2018-08-13 RX ORDER — FUROSEMIDE 20 MG/1
TABLET ORAL
Qty: 90 TAB | Refills: 0 | Status: SHIPPED | OUTPATIENT
Start: 2018-08-13 | End: 2018-11-10 | Stop reason: SDUPTHER

## 2018-09-28 ENCOUNTER — OFFICE VISIT (OUTPATIENT)
Dept: NEUROLOGY | Facility: MEDICAL CENTER | Age: 73
End: 2018-09-28
Payer: MEDICARE

## 2018-09-28 VITALS
RESPIRATION RATE: 16 BRPM | SYSTOLIC BLOOD PRESSURE: 122 MMHG | HEART RATE: 79 BPM | WEIGHT: 176.1 LBS | OXYGEN SATURATION: 97 % | TEMPERATURE: 98.5 F | BODY MASS INDEX: 29.34 KG/M2 | HEIGHT: 65 IN | DIASTOLIC BLOOD PRESSURE: 62 MMHG

## 2018-09-28 DIAGNOSIS — G20.A1 PARKINSON DISEASE: ICD-10-CM

## 2018-09-28 DIAGNOSIS — Z91.81 RISK FOR FALLS: ICD-10-CM

## 2018-09-28 PROCEDURE — 99214 OFFICE O/P EST MOD 30 MIN: CPT | Performed by: PSYCHIATRY & NEUROLOGY

## 2018-09-28 RX ORDER — CARBIDOPA/LEVODOPA 25MG-250MG
1 TABLET ORAL 3 TIMES DAILY
Qty: 270 TAB | Refills: 1 | Status: SHIPPED | OUTPATIENT
Start: 2018-09-28 | End: 2019-01-07 | Stop reason: SDUPTHER

## 2018-09-28 NOTE — PROGRESS NOTES
NEUROLOGY NOTE    Referring Physician  Skylar Evans M.D.      CHIEF COMPLAINT:  Tremor noticed by his PCP  Chief Complaint   Patient presents with   • Follow-Up     Parkinson's       PRESENT ILLNESS:   Tremor noticed by his PCP    Parkinson disease,   Drooling for 4+ years  Tremor noticed by PCP last year  The patient lives alone  The patient denied any side effects to sinemet so far    Resting tremor - both sides--R>L  Rigidity both sides  Bradykinesia both sides  Postural reflex : not in big trouble YET    Masked face      PAST MEDICAL HISTORY:  Past Medical History:   Diagnosis Date   • Cancer (HCC) 2013    prostate   • Cataract        PAST SURGICAL HISTORY:  Past Surgical History:   Procedure Laterality Date   • NODE DISSECTION  7/30/2013    Performed by Nain Mckeon M.D. at SURGERY [facility]   • PROSTATECTOMY, RADICAL RETRO  7/30/2013    radical prostatectomy performed by Nain Mckeon M.D. at SURGERY [facility]   • CATARACT PHACO WITH IOL  2006    robert eyes   • OTHER         FAMILY HISTORY:  Family History   Problem Relation Age of Onset   • Breast Cancer Mother    • Heart Disease Father         bypass   • Hypertension Father    • Heart Failure Father    • Diabetes Sister    • No Known Problems Brother    • No Known Problems Maternal Grandmother    • Stroke Paternal Grandmother    • No Known Problems Brother    • No Known Problems Brother    • No Known Problems Brother    • No Known Problems Brother        SOCIAL HISTORY:  Social History     Social History   • Marital status: Single     Spouse name: N/A   • Number of children: N/A   • Years of education: N/A     Occupational History   • Not on file.     Social History Main Topics   • Smoking status: Current Every Day Smoker     Packs/day: 2.00     Years: 50.00     Types: Pipe   • Smokeless tobacco: Never Used      Comment: Started smoking at age 23   • Alcohol use No   • Drug use: No   • Sexual activity: No     Other Topics Concern   • Not on  "file     Social History Narrative   • No narrative on file     ALLERGIES:  Allergies   Allergen Reactions   • Pcn [Penicillins]      TOBHX  History   Smoking Status   • Current Every Day Smoker   • Packs/day: 2.00   • Years: 50.00   • Types: Pipe   Smokeless Tobacco   • Never Used     Comment: Started smoking at age 23     ALCHX  History   Alcohol Use No     DRUGHX  History   Drug Use No           MEDICATIONS:  Current Outpatient Prescriptions   Medication   • furosemide (LASIX) 20 MG Tab   • carbidopa-levodopa (SINEMET)  MG Tab   • Potassium Chloride CR (MICRO-K) 8 MEQ Cap CR capsule   • Cholecalciferol (VITAMIN D) 2000 UNIT TABS   • sulfamethoxazole-trimethoprim (BACTRIM DS, SEPTRA DS) 800-160 MG per tablet   • oxycodone-acetaminophen (PERCOCET) 5-325 MG TABS     No current facility-administered medications for this visit.        REVIEW OF SYSTEM:    Constitutional: Denies fevers, Denies weight changes   Eyes: Denies changes in vision, no eye pain   Ears/Nose/Throat/Mouth: Denies nasal congestion or sore throat   Cardiovascular: Denies chest pain or palpitations   Respiratory: Denies SOB.   Gastrointestinal/Hepatic: Denies abdominal pain, nausea, vomiting, diarrhea, constipation or GI bleeding   Genitourinary: Denies bladder dysfunction, dysuria or frequency   Musculoskeletal/Rheum: Denies joint pain and swelling   Skin/Breast: Denies rash, denies breast lumps or discharge   Neurological: parkinson disease stage II- III  Psychiatric: denies mood disorder   Endocrine: denies hx of diabetes or thyroid dysfunction   Heme/Oncology/Lymph Nodes: Denies enlarged lymph nodes, denies brusing or known bleeding disorder   Allergic/Immunologic: Denies hx of allergies         PHYSICAL AND NEUROLOGICAL EXMAINATIONS:  VITAL SIGNS: /62 (BP Location: Right arm, Patient Position: Sitting, BP Cuff Size: Adult)   Pulse 79   Temp 36.9 °C (98.5 °F) (Temporal)   Resp 16   Ht 1.651 m (5' 5\")   Wt 79.9 kg (176 lb 1.6 oz)  "  SpO2 97%   BMI 29.30 kg/m²   CURRENT WEIGHT: [unfilled]  BMI: Body mass index is 29.3 kg/m².  PREVIOUS WEIGHTS:  Wt Readings from Last 25 Encounters:   09/28/18 79.9 kg (176 lb 1.6 oz)   05/21/18 81.9 kg (180 lb 8.9 oz)   05/16/18 80.7 kg (178 lb 0.3 oz)   01/02/18 82.6 kg (182 lb)   02/28/17 85.1 kg (187 lb 9.8 oz)   05/17/16 82.6 kg (182 lb)   05/04/15 85.3 kg (188 lb)   07/30/13 85.3 kg (188 lb)       General appearance of patient: WDWN(+) NAD(+)    EYES  o Fundus : Papilledem(-) Exudates(-) Hemorrhage(-)  Nervous System  Orientation to time, place and person(+)  Memory normal(-)  Language: aphasia(-)  Knowledge: past(+) Current(+)  Attention(+)  Cranial Nerves  • Nerve 2: intact  • Nerve 3,4,6: intact  • Nerve 5 : intact  • Nerve 7: intact  • Nerve 8: intact  • Nerve 9 & 10: intact  • Nerve 11: intact  • Nerve 12: intact  Muscle Power and muscle tone: symmetric, normal in upper and lower  Sensory System: Pin sensation intact(+)  Reflexes: symmetric throughout  Cerebellar Function FNP normal   Gait : still able to walk without help  Heart and Vascular  Peripheral Vasucular system : Edema (-) Swelling(-)  RHB, Breathing sound clear  abdomen bowel sound normoactive  Extremities freely moveable  Joints no contracture       NEUROIMAGING: I reviewed the MRI/CT of brain       LAB:        Retired       Resting tremor - both sides-- :R>L  Rigidity both sides  Bradykinesia both sides  Postural reflex : one fall since the last visit      IMPRESSION:    1. Parkinson Disease-- Stage III  2. Prostate Carcinoma status post surgery  3. 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 Cognitive Decline  5. One fall since last visit    PLAN/RECOMMENDATIONS:    Change sinemet 25/100mg 2 tablets three times per day to sinemet 25/250mg three times per day  Stop if any side effects  Please contact us if no improvement    The patient lives alone and do not have good  "social support        ________________________________________________________________________      Exercise muscle and brain    Weight loss    ________________________________________________________________________        It is fine to try the following nutritional supplementation-- no guarantee these would help though  However, it is worth to try   ________________________________________________________________________    Fish Oil -- Omega 3 1000mg 3# daily  Try Daily Probiotic too  Vit D-3 4000 unit daily  ________________________________________________________________________  ________________________________________________________________________    Magnesium -L-Threonate Capsules (\"Magtein\") 2000mg daily  Vit B3 500mg daily  Vitamin B1( thiamine) 250mg daily  Multiple Vitamin Daily  ________________________________________________________________________          SIGNATURE:  Dudley Sotelo    CC:  Skylar Evans M.D.        "

## 2018-09-28 NOTE — PATIENT INSTRUCTIONS
"  Resting tremor - both sides-- :R>L  Rigidity both sides  Bradykinesia both sides  Postural reflex : one fall since the last visit      IMPRESSION:    1. Parkinson Disease-- Stage III  2. Prostate Carcinoma status post surgery  3. 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 Cognitive Decline  5. One fall since last visit    PLAN/RECOMMENDATIONS:    Change sinemet 25/100mg 2 tablets three times per day to sinemet 25/250mg three times per day  Stop if any side effects  Please contact us if no improvement    The patient lives alone and do not have good social support        ________________________________________________________________________      Exercise muscle and brain    Weight loss    ________________________________________________________________________        It is fine to try the following nutritional supplementation-- no guarantee these would help though  However, it is worth to try   ________________________________________________________________________    Fish Oil -- Omega 3 1000mg 3# daily  Try Daily Probiotic too  Vit D-3 4000 unit daily  ________________________________________________________________________  ________________________________________________________________________    Magnesium -L-Threonate Capsules (\"Magtein\") 2000mg daily  Vit B3 500mg daily  Vitamin B1( thiamine) 250mg daily  Multiple Vitamin Daily  ________________________________________________________________________            "

## 2018-11-10 DIAGNOSIS — R60.0 LOCALIZED EDEMA: ICD-10-CM

## 2018-11-12 DIAGNOSIS — R60.0 LOCALIZED EDEMA: ICD-10-CM

## 2018-11-12 RX ORDER — FUROSEMIDE 20 MG/1
20 TABLET ORAL DAILY
Qty: 90 TAB | Refills: 0 | Status: SHIPPED | OUTPATIENT
Start: 2018-11-12 | End: 2019-05-28 | Stop reason: SDUPTHER

## 2018-11-12 RX ORDER — FUROSEMIDE 20 MG/1
20 TABLET ORAL DAILY
Qty: 90 TAB | Refills: 0 | Status: SHIPPED | OUTPATIENT
Start: 2018-11-12 | End: 2018-11-12 | Stop reason: SDUPTHER

## 2018-12-31 ENCOUNTER — PATIENT OUTREACH (OUTPATIENT)
Dept: HEALTH INFORMATION MANAGEMENT | Facility: OTHER | Age: 73
End: 2018-12-31

## 2019-01-01 NOTE — PROGRESS NOTES
[facility] : Doesnt remember where he had a colonoscopy done at  unable to request the records & stated he already received the flu vaccine i checked werb iz its not on there

## 2019-01-07 ENCOUNTER — OFFICE VISIT (OUTPATIENT)
Dept: NEUROLOGY | Facility: MEDICAL CENTER | Age: 74
End: 2019-01-07
Payer: MEDICARE

## 2019-01-07 VITALS
SYSTOLIC BLOOD PRESSURE: 128 MMHG | HEIGHT: 65 IN | DIASTOLIC BLOOD PRESSURE: 60 MMHG | BODY MASS INDEX: 29.16 KG/M2 | WEIGHT: 175 LBS | OXYGEN SATURATION: 96 % | TEMPERATURE: 97.6 F | HEART RATE: 66 BPM | RESPIRATION RATE: 16 BRPM

## 2019-01-07 DIAGNOSIS — Z91.81 RISK FOR FALLS: ICD-10-CM

## 2019-01-07 DIAGNOSIS — G20.A1 PARKINSON DISEASE: ICD-10-CM

## 2019-01-07 PROCEDURE — 99214 OFFICE O/P EST MOD 30 MIN: CPT | Performed by: PSYCHIATRY & NEUROLOGY

## 2019-01-07 RX ORDER — CARBIDOPA/LEVODOPA 25MG-250MG
1 TABLET ORAL 3 TIMES DAILY
Qty: 270 TAB | Refills: 1 | Status: SHIPPED | OUTPATIENT
Start: 2019-01-07 | End: 2019-05-28 | Stop reason: SDUPTHER

## 2019-01-07 ASSESSMENT — PATIENT HEALTH QUESTIONNAIRE - PHQ9: CLINICAL INTERPRETATION OF PHQ2 SCORE: 0

## 2019-01-07 NOTE — PATIENT INSTRUCTIONS
IMPRESSION:    1. Parkinson Disease-- Stage III  2. Prostate Carcinoma status post surgery  3. 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 Cognitive Decline  5. 5 falls since last visit- unsteady gait continue to deteriorate    PLAN/RECOMMENDATIONS:    Continue sinemet  25/250mg three times per day  Because of frequent falls, I would like to recommend the patient to have physical therapy for gait training    The patient lives alone and do not have good social support here  He is considering moving to [place] to be close to his family members-- which sounds a good idea from my perspective  He might be still a candidate for DBS-- deep brain stimulation      ________________________________________________________________________      Exercise muscle and brain    Weight loss    ________________________________________________________________________      We explained the staging of Parkinson Disease    Modified Baryden and Yahr staging     Stage 0 No signs of disease   Stage 1 Unilateral disease   Stage 1.5 Unilateral plus axial involvement   Stage 2 Bilateral disease, without impairment of balance   Stage 2.5 Mild bilateral disease, with recovery on pull test   Stage 3  Mild to moderate bilateral disease; some postural instability; physically independent   Stage 4 Severe disability; still able to walk or stand unassisted   Stage 5 Wheelchair bound or bedridden unless aided           We also discussed about the possibility of Deep Brain Stimulation for advanced parkinson disease  Deep Brain Stimulation for Parkinson's Disease Information     At present, the procedure is used only for individuals whose symptoms cannot be adequately controlled with medications. However, only individuals who improve to some degree after taking medication for Parkinson’s benefit from DBS. A variety of conditions may mimic PD but do not respond to medications or DBS.  DBS uses  a surgically implanted, battery-operated medical device called an implantable pulse generator (IPG) - similar to a heart pacemaker and approximately the size of a  stopwatch to - deliver electrical stimulation to specific areas in the brain that control movement, thus blocking the abnormal nerve signals that cause PD symptoms.    http://www.ninds.nih.gov/disorders/deep_brain_stimulation/deep_brain_stimulation.htm        SIGNATURE:  Dudley Sotelo    CC:  Skylar Evans M.D.

## 2019-01-07 NOTE — PROGRESS NOTES
NEUROLOGY NOTE    Referring Physician  Skylar Evans M.D.      CHIEF COMPLAINT:  Tremor noticed by his PCP since 2017  5 falls since last visit  Chief Complaint   Patient presents with   • Follow-Up     Parkinson disease       PRESENT ILLNESS:   Tremor noticed by his PCP  5 falls since last visit  Parkinson disease,   Drooling for 4+ years  Tremor noticed by PCP 2017  The patient lives alone  The patient denied any side effects to sinemet so far    Resting tremor - both sides--R>L  Rigidity both sides  Bradykinesia both sides  Postural reflex : not in big trouble YET    Masked face      PAST MEDICAL HISTORY:  Past Medical History:   Diagnosis Date   • Cancer (HCC) 2013    prostate   • Cataract        PAST SURGICAL HISTORY:  Past Surgical History:   Procedure Laterality Date   • NODE DISSECTION  7/30/2013    Performed by Nain Mckeon M.D. at SURGERY [facility]   • PROSTATECTOMY, RADICAL RETRO  7/30/2013    radical prostatectomy performed by Nain Mckeon M.D. at SURGERY [facility]   • CATARACT PHACO WITH IOL  2006    robert eyes   • OTHER         FAMILY HISTORY:  Family History   Problem Relation Age of Onset   • Breast Cancer Mother    • Heart Disease Father         bypass   • Hypertension Father    • Heart Failure Father    • Diabetes Sister    • No Known Problems Brother    • No Known Problems Maternal Grandmother    • Stroke Paternal Grandmother    • No Known Problems Brother    • No Known Problems Brother    • No Known Problems Brother    • No Known Problems Brother        SOCIAL HISTORY:  Social History     Social History   • Marital status: Single     Spouse name: N/A   • Number of children: N/A   • Years of education: N/A     Occupational History   • Not on file.     Social History Main Topics   • Smoking status: Current Every Day Smoker     Packs/day: 2.00     Years: 50.00     Types: Pipe   • Smokeless tobacco: Never Used      Comment: Started smoking at age 23   • Alcohol use No   • Drug use:  No   • Sexual activity: No     Other Topics Concern   • Not on file     Social History Narrative   • No narrative on file     ALLERGIES:  Allergies   Allergen Reactions   • Pcn [Penicillins]      TOBHX  History   Smoking Status   • Current Every Day Smoker   • Packs/day: 2.00   • Years: 50.00   • Types: Pipe   Smokeless Tobacco   • Never Used     Comment: Started smoking at age 23     ALCHX  History   Alcohol Use No     DRUGHX  History   Drug Use No           MEDICATIONS:  Current Outpatient Prescriptions   Medication   • furosemide (LASIX) 20 MG Tab   • carbidopa-levodopa (SINEMET)  MG Tab   • Potassium Chloride CR (MICRO-K) 8 MEQ Cap CR capsule   • sulfamethoxazole-trimethoprim (BACTRIM DS, SEPTRA DS) 800-160 MG per tablet   • Cholecalciferol (VITAMIN D) 2000 UNIT TABS   • oxycodone-acetaminophen (PERCOCET) 5-325 MG TABS     No current facility-administered medications for this visit.        REVIEW OF SYSTEM:    Constitutional: Denies fevers, Denies weight changes   Eyes: Denies changes in vision, no eye pain   Ears/Nose/Throat/Mouth: Denies nasal congestion or sore throat   Cardiovascular: Denies chest pain or palpitations   Respiratory: Denies SOB.   Gastrointestinal/Hepatic: Denies abdominal pain, nausea, vomiting, diarrhea, constipation or GI bleeding   Genitourinary: Denies bladder dysfunction, dysuria or frequency   Musculoskeletal/Rheum: Denies joint pain and swelling   Skin/Breast: Denies rash, denies breast lumps or discharge   Neurological: parkinson disease stage II- III  Psychiatric: denies mood disorder   Endocrine: denies hx of diabetes or thyroid dysfunction   Heme/Oncology/Lymph Nodes: Denies enlarged lymph nodes, denies brusing or known bleeding disorder   Allergic/Immunologic: Denies hx of allergies         PHYSICAL AND NEUROLOGICAL EXMAINATIONS:  VITAL SIGNS: /60 (BP Location: Right arm, Patient Position: Sitting, BP Cuff Size: Adult)   Pulse 66   Temp 36.4 °C (97.6 °F) (Temporal)   " Resp 16   Ht 1.651 m (5' 5\")   Wt 79.4 kg (175 lb)   SpO2 96%   BMI 29.12 kg/m²   CURRENT WEIGHT: [unfilled]  BMI: Body mass index is 29.12 kg/m².  PREVIOUS WEIGHTS:  Wt Readings from Last 25 Encounters:   01/07/19 79.4 kg (175 lb)   09/28/18 79.9 kg (176 lb 1.6 oz)   05/21/18 81.9 kg (180 lb 8.9 oz)   05/16/18 80.7 kg (178 lb 0.3 oz)   01/02/18 82.6 kg (182 lb)   02/28/17 85.1 kg (187 lb 9.8 oz)   05/17/16 82.6 kg (182 lb)   10/05/15 83.2 kg (183 lb 6 oz)   05/04/15 85.3 kg (188 lb)   07/30/13 85.3 kg (188 lb)       General appearance of patient: WDWN(+) NAD(+)    EYES  o Fundus : Papilledem(-) Exudates(-) Hemorrhage(-)  Nervous System  Orientation to time, place and person(+)  Memory normal(-)      ________________________________________________________________________      We also asked the patient to copy the pentagons, draw clocks, writing  The patient's work will be scanned into the media section    ________________________________________________________________________  Language: aphasia(-)  Knowledge: past(+) Current(+)  Attention(+)  Cranial Nerves  • Nerve 2: intact  • Nerve 3,4,6: intact  • Nerve 5 : intact  • Nerve 7: intact  • Nerve 8: intact  • Nerve 9 & 10: intact  • Nerve 11: intact  • Nerve 12: intact  Muscle Power and muscle tone: symmetric, normal in upper and lower  Sensory System: Pin sensation intact(+)  Reflexes: symmetric throughout  Cerebellar Function FNP normal   Gait : still able to walk without help  Heart and Vascular  Peripheral Vasucular system : Edema (-) Swelling(-)  RHB, Breathing sound clear  abdomen bowel sound normoactive  Extremities freely moveable  Joints no contracture       NEUROIMAGING: I reviewed the MRI/CT of brain       LAB:        Retired       Resting tremor - both sides-- :R>L  Rigidity both sides  Bradykinesia both sides  Postural reflex : one fall since the last visit      IMPRESSION:    1. Parkinson Disease-- Stage III  2. Prostate Carcinoma status " post surgery  3. 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 Cognitive Decline  5. 5 falls since last visit- unsteady gait continue to deteriorate    PLAN/RECOMMENDATIONS:    Continue sinemet  25/250mg three times per day  Because of frequent falls, I would like to recommend the patient to have physical therapy for gait training    The patient lives alone and do not have good social support here  He is considering moving to [place] to be close to his family members-- which sounds a good idea from my perspective  He might be still a candidate for DBS-- deep brain stimulation      ________________________________________________________________________      Exercise muscle and brain    Weight loss    ________________________________________________________________________      We explained the staging of Parkinson Disease    Modified Brayden and Yahr staging     Stage 0 No signs of disease   Stage 1 Unilateral disease   Stage 1.5 Unilateral plus axial involvement   Stage 2 Bilateral disease, without impairment of balance   Stage 2.5 Mild bilateral disease, with recovery on pull test   Stage 3  Mild to moderate bilateral disease; some postural instability; physically independent   Stage 4 Severe disability; still able to walk or stand unassisted   Stage 5 Wheelchair bound or bedridden unless aided           We also discussed about the possibility of Deep Brain Stimulation for advanced parkinson disease  Deep Brain Stimulation for Parkinson's Disease Information     At present, the procedure is used only for individuals whose symptoms cannot be adequately controlled with medications. However, only individuals who improve to some degree after taking medication for Parkinson’s benefit from DBS. A variety of conditions may mimic PD but do not respond to medications or DBS.  DBS uses a surgically implanted, battery-operated medical device called an implantable  pulse generator (IPG) - similar to a heart pacemaker and approximately the size of a  stopwatch to - deliver electrical stimulation to specific areas in the brain that control movement, thus blocking the abnormal nerve signals that cause PD symptoms.    http://www.ninds.nih.gov/disorders/deep_brain_stimulation/deep_brain_stimulation.htm        SIGNATURE:  Dudley Sotelo    CC:  Skylar Evans M.D.

## 2019-01-15 ENCOUNTER — PHYSICAL THERAPY (OUTPATIENT)
Dept: PHYSICAL THERAPY | Facility: REHABILITATION | Age: 74
End: 2019-01-15
Attending: PSYCHIATRY & NEUROLOGY
Payer: MEDICARE

## 2019-01-15 DIAGNOSIS — G20.A1 PARKINSON DISEASE: ICD-10-CM

## 2019-01-15 DIAGNOSIS — Z91.81 RISK FOR FALLS: ICD-10-CM

## 2019-01-15 PROCEDURE — 97163 PT EVAL HIGH COMPLEX 45 MIN: CPT

## 2019-01-15 PROCEDURE — 97110 THERAPEUTIC EXERCISES: CPT

## 2019-01-15 ASSESSMENT — ACTIVITIES OF DAILY LIVING (ADL)
AMBULATION_WITHOUT_ASSISTIVE_DEVICE: INDEPENDENT
POOR_BALANCE: 1

## 2019-01-15 ASSESSMENT — ENCOUNTER SYMPTOMS
PAIN SCALE AT HIGHEST: 0
PAIN SCALE AT LOWEST: 0
PAIN SCALE: 0

## 2019-01-15 NOTE — OP THERAPY EVALUATION
Outpatient Physical Therapy  INITIAL NEUROLOGICAL EVALUATION    [facility] Physical Therapy [address]  [address]  [place] [address]  Phone:  [phone]  Fax:  [phone]    Date of Evaluation: 01/15/2019    Patient: Primo Abarca II  YOB: 1945  MRN: [id number]     Referring Provider: Dudley Sotelo M.D.  [address] [facility] [address]  [place] [address]   Referring Diagnosis Parkinson's disease [G20];History of falling [Z91.81]     Time Calculation  Start time: 1000  Stop time: 1115 Time Calculation (min): 75 minutes     Physical Therapy Occurrence Codes    Date of onset of impairment:  1/15/19   Date physical therapy care plan established or reviewed:  1/15/19   Date physical therapy treatment started:  1/15/19          Chief Complaint: Difficulty Walking    Visit Diagnoses     ICD-10-CM   1. Parkinson disease (HCC) G20   2. Risk for falls Z91.81       Subjective:   History of Present Illness:     Mechanism of injury:  H/o Parkinson's Disease, diagnosed in 2018 but per pt symptoms were present years prior. He currently walks 4-5x/week on [employer] land without AD, did have a day where he had 3 falls in one day while walking out there. However, he states this only happened 1 day. He reports he has had no falls while walking in his home, but he frequently falls getting up out of dining room chair, most recently this happened last night. He states it is difficult for him to get up off the floor. He reports he feels ridgid when getting out of a chair and thinks he may not be fully uncrossing his legs before he starts walking.  Possible life alert for walking outside- pt is concerned he may be walking outside of the range that a life alert would work so he has not purchased on. HE reports he will be moving to [place], [place] this year to be closer to family but he is unsure of the exact date.  PT office requested to walk with pt to his car as he had to park in a different parking lot, but pt  refused and asked to have no assistance walking back to his car after the eval was complete.  Prior level of function:  Enjoys walking outside and reading  Headaches:  no headaches  Ear problems: ringing  Sleep disturbance:  Not disrupted  Pain:     Current pain ratin    At best pain ratin    At worst pain ratin  Social Support:     Lives in:  Multiple-level home (1 step to enter)    Lives with:  Alone  Patient Goals:     Other patient goals:  Decrease falls.       Past Medical History:   Diagnosis Date   • Cancer (HCC)     prostate   • Cataract      Past Surgical History:   Procedure Laterality Date   • NODE DISSECTION  2013    Performed by Nain Mckeon M.D. at [place] [facility] [place]   • PROSTATECTOMY, RADICAL RETRO  2013    radical prostatectomy performed by Nain Mckeon M.D. at [place] [place]   • CATARACT PHACO WITH IOL  2006    robert eyes   • OTHER       Social History   Substance Use Topics   • Smoking status: Current Every Day Smoker     Packs/day: 2.00     Years: 50.00     Types: Pipe   • Smokeless tobacco: Never Used      Comment: Started smoking at age 23   • Alcohol use No     Family and Occupational History     Social History   • Marital status: Single     Spouse name: N/A   • Number of children: N/A   • Years of education: N/A       Objective:   Active Range of Motion:   Upper extremity (left):     AROM Left Shoulder Flexion: 110 degrees     AROM Left Shoulder Abduction: 100 degrees.    Shoulder external rotation: Within functional limits    Shoulder internal rotation: Within functional limits    Elbow flexion: Within functional limits    AROM Left Elbow Extension: 14 degrees from 0.    Forearm pronation: Within functional limits    Forearm supination: Within functional limits    Wrist flexion: Below functional limits    Wrist extension: Within functional limits  Upper extremity (right):     AROM Right Shoulder Flexion: 124 degrees.    AROM Right Shoulder  Abduction: 115 degrees.    AROM Right Shoulder External Rotation: 65 degrees.    Shoulder internal rotation: Within functional limits    AROM Right Elbow Flexion: 132 degrees.    AROM Right Elbow Extension: 14 from 0 degrees.    Forearm pronation: Within functional limits    Forearm supination: Within functional limits    Wrist flexion: Below functional limits    Wrist extension: Within functional limits  Lower extremity (left):     AROM Left Hip Flexion: 105.    AROM Left Hip Adduction: 20.    AROM Left Knee Flexion: 130.    AROM Left Knee Extension: 0 degrees (supine)    AROM Left Ankle Dorsiflexion: -5 degrees.  Lower extremity (right):     AROM Right Hip Flexion: 100 degrees.    AROM Right Hip Abduction: 20 degrees.    AROM Right Knee Flexion: 130 degrees.    AROM Right Knee Extension: 0 degrees (supine)    AROM Right Ankle Dorsiflexion: 0 degrees.  Active range of motion comments: Pt with rounded shoulders and increased thoracic kyphosis. Cervical extension AROM to neutral only.      Strength:   Lower extremity (left):     Hip flexion: 5    Hip abduction: 5    Hip adduction: 5    Knee flexion: 4-    Knee extension: 4+    Ankle dorsiflexion: 3    Ankle plantar flexion: 3+  Lower extremity (right):     Hip flexion: 5    Hip abduction: 5    Hip adduction: 5    Knee flexion: 4-    Knee extension: 4+    Ankle dorsiflexion: 3    Ankle plantar flexion: 3+    Strength Comments:  B shoulder strength WFL within available range    Tone, Sensation and Coordination:   Tone:     Left upper extremity muscle tone: Resting tremors and Rigid    Right upper extremity muscle tone: Rigid    Left lower extremity muscle tone: Rigid    Right lower extremity muscle tone: Rigid    Modified Dada:     Tone comments:   Cogwheel ridgity present with B elbow extension. Ridgity present with B hip flexion and kne flex/ext, no catching present but tightness present at end range bilaterally    Sensation   Upper extremity (left):     Light  "touch: Intact  Upper extremity (right):     Light touch: Intact  Lower extremity (left):     Light touch: Intact  Lower extremity (right):     Light touch: Intact    Coordination   Upper extremity (left):     Gross motor: Within functional limits  Upper extremity (right):     Gross motor: Within functional limits  Lower extremity (left):     Toe tapping: Within functional limits    Heel to shin: Within functional limits  Lower extremity (right):     Toe tapping: Within functional limits    Heel to shin: Within functional limits    Cognition:     Orientation: normal to time, normal to situation, disoriented to person, normal to place and normal to person    Direction following: three step    Short term memory: intact    Long term memory: intact    Attention span: intact    Sequencing: intact    Vision/Perception:     Visual tracking: intact    Convergence: impaired    Ambulation: Level Surfaces   Ambulation without assistive device: independent    Observational Gait   Gait: crouched pattern   Stride length within functional limits.   Stride width: narrow.    Left foot contact pattern: foot flat  Right foot contact pattern: foot flat  Left arm swing: decreased  Right arm swing: decreased  Base of support: narrow    Additional Observational Gait Details  Pt takes sharp turns, nearly clipping doorway upon exit. No festination present during amb throughout clinic today    Balance/Gait Comments   5x sit to stand: 18 sec SBA   TUG: 15 seconds without AD SPV    Activities of Daily Living:   Transfers/Mobility:     Sit to stand: supervision (Pt requires use of hands and rests back of knees on chair which often moves lightweight chair)        Therapeutic Exercises (CPT 64850):     1. Seated march with emphasis on \"BIG\" , 10x alternating    2. Seated LAQ with emphasis on \"BIG\", 10x alternating    3. Seated heel raise/toe raise, 10x    4. Seated UE circling with emphasis on \"BIG\" , 5x, cues for upright posture    5. Sit to " stand , 5x, 5 second hold before initiating movement      Therapeutic Exercise Summary: Discussed possible need for life alert and/or cell phone due to h/o falls. Also recommended walking sticks for amb outside the home, pt stated he would consider it. For sit to stand at dining room: pt to check feet first, stand and wait 5 seconds before initiatign movement to decrease fall risk.      Time-based treatments/modalities:  Therapeutic exercise minutes (CPT 50411): 15 minutes       Assessment, Response and Plan:   Impairments: abnormal gait, impaired balance, impaired physical strength and safety issue    Other Impairments:  Postural dysfuncyion  Assessment details:  Pt is a pleasant 72 yo male that presents to PT with gait dysfunction and impaired balance. Pt's TUG score does indicate he is at risk for falls for his age group. Extensive discussion was had with pt re: safety in the home and especially while trail walking, but pt is hesitant to use an AD at this time. Pt's current functional impairments include: decreased strength, gait abnormality, impaired balance, and safety issue. He would benefit from skilled PT to address the above listed functional impairments to increase his balance and strength as well as improve his gait to increase his overall safety and decrease his risk for falls.  Prognosis: fair    Goals:   Short Term Goals:   1. Pt will amb independent with increased stance without crossover or R and L LE throughout PT session  2. No falls in the home  3. Pt will purchase walking sticks for outdoor amb  Short term goal time span:  4-6 weeks      Long Term Goals:    1. Improve TUG to 10 seconds without AD  2. Improve 5x sit to stand to 12 seconds SPV  3. No falls with outdoor amb  Long term goal time span:  1-2 months    Plan:   Therapy options:  Physical therapy treatment to continue  Planned therapy interventions:  Functional Training, Self Care (CPT 57267), Gait Training (CPT 85575), Manual Therapy  (CPT 83731), Neuromuscular Re-education (CPT 55179), Therapeutic Exercise (CPT 45857) and Therapeutic Activities (CPT 29912)  Frequency:  2x week  Duration in weeks:  10  Duration in visits:  20  Discussed with:  Patient      Functional Limitations and Severity Modifiers  PT Functional Assessment Tool Used: TUG  PT Functional Assessment Score: 15 seconds without AD       Referring provider co-signature:  I have reviewed this plan of care and my co-signature certifies the need for services.  Certification Dates:   From 1/15/19     To 3/29/19    Physician Signature: ________________________________ Date: ______________

## 2019-01-15 NOTE — OP THERAPY EVALUATION
Outpatient Physical Therapy  INITIAL EVALUATION    [facility] Physical Therapy [address].  [address]  [place] [address]  Phone:  [phone]  Fax:  [phone]    Date of Evaluation: 01/15/2019    Patient: Primo Abarca II  YOB: 1945  MRN: [id number]     Referring Provider: Dudley Sotelo M.D.  [address] [facility] [address]  [place] [address]   Referring Diagnosis Parkinson's disease [G20];History of falling [Z91.81]     Time Calculation                 Chief Complaint: No chief complaint on file.    Visit Diagnoses     ICD-10-CM   1. Parkinson disease (HCC) G20   2. Risk for falls Z91.81         Subjective:   History of Present Illness:     Mechanism of injury:  H/o Parkinson's Disease  Falls:  Live alone?  TUG vs 5x sit to stand!!      Past Medical History:   Diagnosis Date   • Cancer (HCC) 2013    prostate   • Cataract      Past Surgical History:   Procedure Laterality Date   • NODE DISSECTION  7/30/2013    Performed by Nain Mckeon M.D. at SURGERY [facility]   • PROSTATECTOMY, RADICAL RETRO  7/30/2013    radical prostatectomy performed by Nain Mckeon M.D. at SURGERY [facility]   • CATARACT PHACO WITH IOL  2006    robert eyes   • OTHER       Social History   Substance Use Topics   • Smoking status: Current Every Day Smoker     Packs/day: 2.00     Years: 50.00     Types: Pipe   • Smokeless tobacco: Never Used      Comment: Started smoking at age 23   • Alcohol use No     Family and Occupational History     Social History   • Marital status: Single     Spouse name: N/A   • Number of children: N/A   • Years of education: N/A       Objective    Exercises/Treatment  Time-based treatments/modalities:          Assessment, Response and Plan:     Plan:   Frequency:  2x week  Duration in weeks:  10  Duration in visits:  20  Discussed with:  Patient      Functional Limitations and Severity Modifiers      Current:     Goal:       Referring provider co-signature:  I have reviewed  this plan of care and my co-signature certifies the need for services.  Certification Dates:   From ***     To ***    Physician Signature: ________________________________ Date: ______________

## 2019-01-16 ENCOUNTER — OFFICE VISIT (OUTPATIENT)
Dept: MEDICAL GROUP | Facility: MEDICAL CENTER | Age: 74
End: 2019-01-16
Payer: MEDICARE

## 2019-01-16 VITALS
DIASTOLIC BLOOD PRESSURE: 62 MMHG | HEIGHT: 63 IN | TEMPERATURE: 97.5 F | OXYGEN SATURATION: 96 % | BODY MASS INDEX: 29.57 KG/M2 | SYSTOLIC BLOOD PRESSURE: 100 MMHG | WEIGHT: 166.89 LBS | HEART RATE: 69 BPM

## 2019-01-16 DIAGNOSIS — M51.9 LUMBAR DISC DISEASE: ICD-10-CM

## 2019-01-16 DIAGNOSIS — G20.A1 PARKINSON DISEASE: ICD-10-CM

## 2019-01-16 DIAGNOSIS — Z85.46 PERSONAL HISTORY OF PROSTATE CANCER: ICD-10-CM

## 2019-01-16 DIAGNOSIS — R73.01 IMPAIRED FASTING GLUCOSE: ICD-10-CM

## 2019-01-16 DIAGNOSIS — Z00.00 MEDICARE ANNUAL WELLNESS VISIT, SUBSEQUENT: ICD-10-CM

## 2019-01-16 DIAGNOSIS — Z00.00 HEALTHCARE MAINTENANCE: ICD-10-CM

## 2019-01-16 DIAGNOSIS — M79.89 LEG SWELLING: ICD-10-CM

## 2019-01-16 DIAGNOSIS — Z91.81 RISK FOR FALLS: ICD-10-CM

## 2019-01-16 PROCEDURE — 8041 PR SCP AHA: Performed by: FAMILY MEDICINE

## 2019-01-16 PROCEDURE — 99213 OFFICE O/P EST LOW 20 MIN: CPT | Mod: 25 | Performed by: FAMILY MEDICINE

## 2019-01-16 PROCEDURE — G0439 PPPS, SUBSEQ VISIT: HCPCS | Performed by: FAMILY MEDICINE

## 2019-01-16 ASSESSMENT — PATIENT HEALTH QUESTIONNAIRE - PHQ9: CLINICAL INTERPRETATION OF PHQ2 SCORE: 0

## 2019-01-16 ASSESSMENT — ACTIVITIES OF DAILY LIVING (ADL): BATHING_REQUIRES_ASSISTANCE: 0

## 2019-01-16 ASSESSMENT — ENCOUNTER SYMPTOMS: GENERAL WELL-BEING: GOOD

## 2019-01-16 NOTE — PROGRESS NOTES
Chief Complaint   Patient presents with   • Annual Wellness Visit         HPI:  Primo is a 73 y.o. here for Medicare Annual Wellness Visit    Medicare annual wellness visit, subsequent  Preventive measures and chronic medical issues reviewed.    Personal history of prostate cancer  He underwent prostatectomy 5 yrs ago. Has been in remission since then, last PSA was normal.    Parkinson disease (HCC)  Patient has been symptomatic, has been having pill rolling tremors, stooping forward, with shuffling gait, hypophonia, and drooling however patient stated that it has improved since he started the Sinemet 25/250 mg.  He is currently on 2 tablets 3 times a day.     Impaired fasting glucose  His A1C in 5/2016 was 6.3. However hist Blood glucose has been normal. Has been asymptomatic.     Leg swelling  Patient has been having a lymphedema as a result of the radiacal prostatectomy for prostate cancer.Has been on lasix since then.  No history of CHF, however patient has been having shortness of breath with exertion.  Has no echocardiogram in his epic records.    Chronic back pain/lumbar disc disease  Chronic intermittent pain due to history of lumbar disc disease however his pain has been well controlled with over-the-counter Tylenol.    Risk for falls  Probably due to shuffling gait called by Parkinson disease.  Patient uses a walker at home, but not when he goes out.    Due for pneumonia 23 vaccine.            Patient Active Problem List    Diagnosis Date Noted   • Parkinson disease (HCC) 05/16/2018   • Risk for falls 01/02/2018   • Family history of coronary artery disease 05/04/2015   • Impaired fasting glucose 04/09/2015   • Edema 03/31/2015   • Lumbar disc disease 03/31/2015   • Personal history of prostate cancer 07/30/2013       Current Outpatient Prescriptions   Medication Sig Dispense Refill   • Multiple Vitamins-Minerals (MENS 50+ ADVANCED PO) Take  by mouth.     • carbidopa-levodopa (SINEMET)  MG Tab Take  1 Tab by mouth 3 times a day. 270 Tab 1   • furosemide (LASIX) 20 MG Tab Take 1 Tab by mouth every day. 90 Tab 0   • Potassium Chloride CR (MICRO-K) 8 MEQ Cap CR capsule TAKE 1 CAPSULE BY MOUTH TWICE DAILY 180 Cap 0   • Cholecalciferol (VITAMIN D) 2000 UNIT TABS Take 4,000 Units by mouth every day.     • sulfamethoxazole-trimethoprim (BACTRIM DS, SEPTRA DS) 800-160 MG per tablet Take 1 Tab by mouth 2 times a day. (Patient not taking: Reported on 1/16/2019) 20 Tab 0   • oxycodone-acetaminophen (PERCOCET) 5-325 MG TABS Take 1 Tab by mouth 4 times a day as needed. (Patient not taking: Reported on 1/16/2019) 60 Tab 0     No current facility-administered medications for this visit.         Patient is taking medications as noted in medication list.  Current supplements as per medication list.     Allergies: Pcn [penicillins]    Current social contact/activities: PT reports living alone and a cat, no social activity     Is patient current with immunizations? No, due for PNEUMOVAX (PPSV23). Patient is interested in receiving PNEUMOVAX (PPSV23) today.    He  reports that he has been smoking Pipe.  He has a 100.00 pack-year smoking history. He has never used smokeless tobacco. He reports that he does not drink alcohol or use drugs.  Ready to quit: Not Answered  Counseling given: Not Answered        DPA/Advanced directive: Patient does not have an Advanced Directive.  A packet and workshop information was given on Advanced Directives.    ROS:    Gait: Uses no assistive device   Ostomy: No   Other tubes: No   Amputations: No   Chronic oxygen use No   Last eye exam 6/2018   Wears hearing aids: No   : Reports urinary leakage during the last 6 months that has somewhat interfered with their daily activities or sleep.     Depression Screening    Little interest or pleasure in doing things?  0 - not at all  Feeling down, depressed, or hopeless? 0 - not at all  Patient Health Questionnaire Score: 0    If depressive symptoms  identified deferred to follow up visit unless specifically addressed in assessment and plan.    Interpretation of PHQ-9 Total Score   Score Severity   1-4 No Depression   5-9 Mild Depression   10-14 Moderate Depression   15-19 Moderately Severe Depression   20-27 Severe Depression    Screening for Cognitive Impairment    Three Minute Recall (leader, season, table)  0/3    Seth clock face with all 12 numbers and set the hands to show 10 past 11.  Yes 2/2  If cognitive concerns identified, deferred for follow up unless specifically addressed in assessment and plan.    Fall Risk Assessment    Has the patient had two or more falls in the last year or any fall with injury in the last year?  Yes  If fall risk identified, deferred for follow up unless specifically addressed in assessment and plan.    Safety Assessment    Throw rugs on floor.  No  Handrails on all stairs.  Yes  Good lighting in all hallways.  No  Difficulty hearing.  No  Patient counseled about all safety risks that were identified.    Functional Assessment ADLs    Are there any barriers preventing you from cooking for yourself or meeting nutritional needs?  No.    Are there any barriers preventing you from driving safely or obtaining transportation?  No.    Are there any barriers preventing you from using a telephone or calling for help?  No.    Are there any barriers preventing you from shopping?  No.    Are there any barriers preventing you from taking care of your own finances?  No.    Are there any barriers preventing you from managing your medications?  No.    Are there any barriers preventing you from showering, bathing or dressing yourself?  No.    Are you currently engaging in any exercise or physical activity?  Yes.  PT reports walking 30-60 minutes 5x per wk.  What is your perception of your health?  Good.    Health Maintenance Summary                IMM ZOSTER VACCINES Overdue 11/30/2017      Done 10/5/2017 Imm Admin: Zoster Vaccine Live (ZVL)  (Zostavax)     Patient has more history with this topic...    IMM INFLUENZA Overdue 9/1/2018      Done 10/5/2017 Imm Admin: Influenza Vaccine Adult HD     Patient has more history with this topic...    IMM PNEUMOCOCCAL 65+ (ADULT) LOW/MEDIUM RISK SERIES Overdue 10/5/2018      Done 10/5/2017 Imm Admin: Pneumococcal Conjugate Vaccine (Prevnar/PCV-13)    Annual Wellness Visit Overdue 1/3/2019      Done 1/2/2018 Visit Dx: Medicare annual wellness visit, initial    COLONOSCOPY Next Due 5/3/2021      Done 5/3/2011     IMM DTaP/Tdap/Td Vaccine Next Due 1/2/2028      Done 1/2/2018 Imm Admin: Tdap Vaccine          Patient Care Team:  Skylar Evans M.D. as PCP - General (Geriatrics)  Nain Mckeon M.D. (Inactive) as Consulting Physician (Urology)  Jennie Javed, PT as Physical Therapy (Physical Therapy)    Social History   Substance Use Topics   • Smoking status: Current Every Day Smoker     Packs/day: 2.00     Years: 50.00     Types: Pipe   • Smokeless tobacco: Never Used      Comment: Started smoking at age 23   • Alcohol use No     Family History   Problem Relation Age of Onset   • Breast Cancer Mother    • Heart Disease Father         bypass   • Hypertension Father    • Heart Failure Father    • Diabetes Sister    • No Known Problems Brother    • No Known Problems Maternal Grandmother    • Stroke Paternal Grandmother    • No Known Problems Brother    • No Known Problems Brother    • No Known Problems Brother    • No Known Problems Brother      He  has a past medical history of Cancer (HCC) (2013) and Cataract.   Past Surgical History:   Procedure Laterality Date   • NODE DISSECTION  7/30/2013    Performed by Nain Mckeon M.D. at SURGERY [facility]   • PROSTATECTOMY, RADICAL RETRO  7/30/2013    radical prostatectomy performed by Nain Mckeon M.D. at SURGERY [facility]   • CATARACT PHACO WITH IOL  2006    robert eyes   • OTHER             Exam:     There were no vitals taken for this visit. There is no  height or weight on file to calculate BMI.    Hearing .    Dentition   Alert, oriented in no acute distress.  Eye contact is good, speech goal directed, affect calm      Assessment and Plan. The following treatment and monitoring plan is recommended:    1. Medicare annual wellness visit, subsequent  Preventive measures and chronic medical issues reviewed.    - Subsequent Annual Wellness Visit - Includes PPPS ()    2. Personal history of prostate cancer  S/P prostatectomy 5 yrs ago. In remission. PSA has been normal.    - Subsequent Annual Wellness Visit - Includes PPPS ()    3. Parkinson disease (HCC)  Stable.  Contine on Sinemet 25/250, 2 tablets 3 times a day.    - Subsequent Annual Wellness Visit - Includes PPPS ()    4. Leg swelling  Probably due to venous insufficiency, however patient has been having shortness of breath with exertion only, no history of CHF, no echo in record.  Will send patient for echocardiogram.    - EC-ECHOCARDIOGRAM COMPLETE W/ CONT; Future    5. Impaired fasting glucose  Last A1c was 6.3.  Asymptomatic.  Patient is counseled about lifestyle modification.  Low carbohydrate and fat diet.    - Subsequent Annual Wellness Visit - Includes PPPS ()  - CBC WITH DIFFERENTIAL; Future  - COMP METABOLIC PANEL; Future  - TSH; Future  - LIPID PANEL    6. Lumbar disc disease  Has been stable.  Chronic intermittent pain, over-the-counter pain medication has been helping.    - Subsequent Annual Wellness Visit - Includes PPPS ()    7. Healthcare maintenance  Due for pneumonia vaccine.    - Pneumococal Polysaccharide Vaccine 23-Valent =>1YO SQ/IM  - Subsequent Annual Wellness Visit - Includes PPPS ()    8. Risk for falls  Probably due to shuffling gait called by Parkinson disease.  Patient uses a walker at home, but not when he goes out.  Advised to use a cane for outdoors activity.    - Patient identified as fall risk.  Appropriate orders and counseling given.  - Subsequent  Annual Wellness Visit - Includes PPPS ()        Services suggested:   Health Care Screening recommendations as per orders if indicated.  Referrals offered: PT/OT/Nutrition counseling/Behavioral Health/Smoking cessation as per orders if indicated.    Discussion today about general wellness and lifestyle habits:    · Prevent falls and reduce trip hazards; Cautioned about securing or removing rugs.  · Have a working fire alarm and carbon monoxide detector;   · Engage in regular physical activity and social activities       Follow-up: No Follow-up on file.

## 2019-01-18 ENCOUNTER — PHYSICAL THERAPY (OUTPATIENT)
Dept: PHYSICAL THERAPY | Facility: REHABILITATION | Age: 74
End: 2019-01-18
Attending: PSYCHIATRY & NEUROLOGY
Payer: MEDICARE

## 2019-01-18 DIAGNOSIS — Z91.81 RISK FOR FALLS: ICD-10-CM

## 2019-01-18 DIAGNOSIS — G20.A1 PARKINSON DISEASE: ICD-10-CM

## 2019-01-18 PROCEDURE — 97110 THERAPEUTIC EXERCISES: CPT

## 2019-01-18 PROCEDURE — 97112 NEUROMUSCULAR REEDUCATION: CPT

## 2019-01-18 NOTE — OP THERAPY DAILY TREATMENT
Outpatient Physical Therapy  DAILY TREATMENT     [facility] Physical [address].  [address]  [place] [address]  Phone:  [phone]  Fax:  [phone]    Date: 01/18/2019    Patient: Primo Abarca II  YOB: 1945  MRN: [id number]     Time Calculation  Start time: 1330  Stop time: 1402 Time Calculation (min): 32 minutes     Chief Complaint: Difficulty Walking    Visit #: 2    SUBJECTIVE:  Pt reports he fell twice last night standing up from dining room chair because his ankles were crossed when he stood.  Pt reports he bumped his head on the right on one fall, and on the left on the other fall.  Also sustained a small abrasion on top of R foot per his report.      OBJECTIVE:        Therapeutic Exercises (CPT 78473):     1. Supine leg curls with exercise ball, 2x10    2. LTR with exercise alyssa, 2x10    3. Supine march, x10    4. Bridges, x10    5. Seated BIG march, x10B    6. Seated BIG LAQ, x10B    7. Seated heel/toe raises, x10B    8. Seated arm circles, x10 CW/CCW, required cues for upright posture      Therapeutic Exercise Summary: Provided handout and education re: HEP (seated exercises and STS).    Therapeutic Treatments and Modalities:     1. Neuromuscular Re-education (CPT 58552), balance/gait    Therapeutic Treatment and Modalities Summary: Big step forward x8B with walking stick and SBA.  Big step backward x8B with walking stick and SBA.  Minisquats with walking stick- cues for hip flexion and technique, x10  STS at edge of mat, cues for positioning, sequencing, and form.    Time-based treatments/modalities:  Therapeutic exercise minutes (CPT 84728): 20 minutes  Neuromusc re-ed, balance, coor, post minutes (CPT 92672): 10 minutes       ASSESSMENT:   Response to treatment: Pt ginger therex and gait activity without pain, mild lightheadedness with stepping exercise, possibly due to looking down.  Will continue to monitor.    PLAN/RECOMMENDATIONS:   Plan for  treatment: therapy treatment to continue next visit.  Planned interventions for next visit: continue with current treatment.

## 2019-01-22 ENCOUNTER — NON-PROVIDER VISIT (OUTPATIENT)
Dept: MEDICAL GROUP | Facility: MEDICAL CENTER | Age: 74
End: 2019-01-22
Payer: MEDICARE

## 2019-01-22 ENCOUNTER — PHYSICAL THERAPY (OUTPATIENT)
Dept: PHYSICAL THERAPY | Facility: REHABILITATION | Age: 74
End: 2019-01-22
Attending: PSYCHIATRY & NEUROLOGY
Payer: MEDICARE

## 2019-01-22 DIAGNOSIS — Z23 NEED FOR VACCINATION: ICD-10-CM

## 2019-01-22 DIAGNOSIS — Z91.81 RISK FOR FALLS: ICD-10-CM

## 2019-01-22 DIAGNOSIS — G20.A1 PARKINSON DISEASE: ICD-10-CM

## 2019-01-22 PROCEDURE — 97112 NEUROMUSCULAR REEDUCATION: CPT

## 2019-01-22 PROCEDURE — 97110 THERAPEUTIC EXERCISES: CPT

## 2019-01-22 PROCEDURE — G0009 ADMIN PNEUMOCOCCAL VACCINE: HCPCS | Performed by: FAMILY MEDICINE

## 2019-01-22 PROCEDURE — 90732 PPSV23 VACC 2 YRS+ SUBQ/IM: CPT | Performed by: FAMILY MEDICINE

## 2019-01-22 NOTE — OP THERAPY DAILY TREATMENT
"  Outpatient Physical Therapy  DAILY TREATMENT     [facility] Physical [address].  [address]  [place] [address]  Phone:  [phone]  Fax:  [phone]    Date: 01/22/2019    Patient: Primo Abarca II  YOB: 1945  MRN: [id number]     Time Calculation  Start time: 0935  Stop time: 1003 Time Calculation (min): 28 minutes     Chief Complaint: Loss Of Balance    Visit #: 3    SUBJECTIVE:  Pt reports he was doing fine, until last night.  He fell when standing from table, visible abrasion on L frontal skull.  Asked pt if he keeps a handheld or mobile phone or alert device with him when at home, he said no.  When asked if he has considered doing so, he said yes, more so for when he walks outside on the trails.  Unsure if LifeAlert type devices will work outside of home.    OBJECTIVE:        Therapeutic Exercises (CPT 30133):     1. NuStep Level 2, x6 min    2. Leg curls on exercise ball, x10    3. LTR with exercise ball, x10    4. Standing heel raises, x10    5. Standing toe taps, x10    6. Seated D2 with hands clasped, x6B    Therapeutic Treatments and Modalities:     1. Neuromuscular Re-education (CPT 82076), balance and gait    Therapeutic Treatment and Modalities Summary: Big step to 4\" step alt R/L with exaggerated arm swing.  Lateral lunge with ipsilateral arm swing, x10B.  Gait with exaggerated arm swing, required multiple attempts and visual and verbal cues to coordinate contralateral arm swing.  R>L arm swing.  Improved arm swing with training and smaller steps.  Standing in place, BIG arm swing, encourage trunk rotation with visual and tactile cues, x20.      Time-based treatments/modalities:  Therapeutic exercise minutes (CPT 71102): 12 minutes  Neuromusc re-ed, balance, coor, post minutes (CPT 24048): 16 minutes     ASSESSMENT:   Response to treatment: Pt ginger therapy session well, requires close SBA with dynamic activities.    PLAN/RECOMMENDATIONS:   Plan for " treatment: therapy treatment to continue next visit.  Planned interventions for next visit: continue with current treatment.

## 2019-01-22 NOTE — PROGRESS NOTES
"Primo Abarca II is a 73 y.o. male here for a non-provider visit for:   PNEUMOVAX (PPSV23)    Reason for immunization: continue or complete series started at the office  Immunization records indicate need for vaccine: Yes, confirmed with Epic  Minimum interval has been met for this vaccine: Yes  ABN completed: Yes    Order and dose verified by: nasim  VIS Dated  4/24/15 was given to patient: Yes  All IAC Questionnaire questions were answered \"No.\"    Patient tolerated injection and no adverse effects were observed or reported: Yes    Pt scheduled for next dose in series: Not Indicated    "

## 2019-01-25 ENCOUNTER — PHYSICAL THERAPY (OUTPATIENT)
Dept: PHYSICAL THERAPY | Facility: REHABILITATION | Age: 74
End: 2019-01-25
Attending: PSYCHIATRY & NEUROLOGY
Payer: MEDICARE

## 2019-01-25 DIAGNOSIS — Z91.81 RISK FOR FALLS: ICD-10-CM

## 2019-01-25 DIAGNOSIS — G20.A1 PARKINSON DISEASE: ICD-10-CM

## 2019-01-25 PROCEDURE — 97110 THERAPEUTIC EXERCISES: CPT

## 2019-01-25 NOTE — OP THERAPY DAILY TREATMENT
Outpatient Physical Therapy  DAILY TREATMENT     [facility] Physical Therapy [address] [place] [address] [facility]  [address]  [place] [address]  Phone:  [phone]  Fax:  [phone]    Date: 01/25/2019    Patient: Primo Abarca II  YOB: 1945  MRN: [id number]     Time Calculation  Start time: 1130  Stop time: 1205 Time Calculation (min): 35 minutes     Chief Complaint: Loss Of Balance    Visit #: 4    SUBJECTIVE:  Pt reports he fell Tuesday night at home, tripped over his shoelaces.  States he has been walking on asphalt driveway and street, has avoided trails because they are wet and slippery.    OBJECTIVE:        Therapeutic Exercises (CPT 47903):     1. Supine leg curls with exercise ball, x10    2. LTR with exercise ball, x10    3. Hooklying march BIG, x10    4. Standing hip ext with white tband, x10 B with BUE support    5. Standing heel raises, x10    6. Standing toe taps, x10 alt R/L    7. Seated B shoulder Vs(cross wrists at bottom, then open up to V), x10     8. Seated alt OH reach, x5    9. Wall slide shoulder flexion B, x5    10. Ws at wall, x10    11. NuStep, Level 2, x5 min    Therapeutic Treatments and Modalities:     1. Neuromuscular Re-education (CPT 19303), balance    Therapeutic Treatment and Modalities Summary: Step tap to ant and lat cones, x5 B with stepping to center between cones, then x5 B without returning to center.  Required close SBA.  Increased difficulty with coordination and balance when stepping with RLE versus LLE.    Time-based treatments/modalities:  Therapeutic exercise minutes (CPT 72821): 25 minutes  Neuromusc re-ed, balance, coor, post minutes (CPT 80382): 5 minutes         ASSESSMENT:   Response to treatment: Pt demo impaired endurance and balance in dynamic situations.    PLAN/RECOMMENDATIONS:   Plan for treatment: therapy treatment to continue next visit.  Planned interventions for next visit: continue with current treatment.

## 2019-02-01 ENCOUNTER — PHYSICAL THERAPY (OUTPATIENT)
Dept: PHYSICAL THERAPY | Facility: REHABILITATION | Age: 74
End: 2019-02-01
Attending: PSYCHIATRY & NEUROLOGY
Payer: MEDICARE

## 2019-02-01 ENCOUNTER — HOSPITAL ENCOUNTER (OUTPATIENT)
Dept: LAB | Facility: MEDICAL CENTER | Age: 74
End: 2019-02-01
Attending: FAMILY MEDICINE
Payer: MEDICARE

## 2019-02-01 DIAGNOSIS — Z91.81 RISK FOR FALLS: ICD-10-CM

## 2019-02-01 DIAGNOSIS — G20.A1 PARKINSON DISEASE: ICD-10-CM

## 2019-02-01 DIAGNOSIS — R73.01 IMPAIRED FASTING GLUCOSE: ICD-10-CM

## 2019-02-01 LAB
ALBUMIN SERPL BCP-MCNC: 4.1 G/DL (ref 3.2–4.9)
ALBUMIN/GLOB SERPL: 1.8 G/DL
ALP SERPL-CCNC: 70 U/L (ref 30–99)
ALT SERPL-CCNC: 6 U/L (ref 2–50)
ANION GAP SERPL CALC-SCNC: 5 MMOL/L (ref 0–11.9)
AST SERPL-CCNC: 22 U/L (ref 12–45)
BASOPHILS # BLD AUTO: 0.5 % (ref 0–1.8)
BASOPHILS # BLD: 0.03 K/UL (ref 0–0.12)
BILIRUB SERPL-MCNC: 0.7 MG/DL (ref 0.1–1.5)
BUN SERPL-MCNC: 21 MG/DL (ref 8–22)
CALCIUM SERPL-MCNC: 9.1 MG/DL (ref 8.5–10.5)
CHLORIDE SERPL-SCNC: 100 MMOL/L (ref 96–112)
CO2 SERPL-SCNC: 29 MMOL/L (ref 20–33)
CREAT SERPL-MCNC: 0.71 MG/DL (ref 0.5–1.4)
EOSINOPHIL # BLD AUTO: 0.08 K/UL (ref 0–0.51)
EOSINOPHIL NFR BLD: 1.4 % (ref 0–6.9)
ERYTHROCYTE [DISTWIDTH] IN BLOOD BY AUTOMATED COUNT: 44 FL (ref 35.9–50)
EST. AVERAGE GLUCOSE BLD GHB EST-MCNC: 131 MG/DL
FASTING STATUS PATIENT QL REPORTED: NORMAL
GLOBULIN SER CALC-MCNC: 2.3 G/DL (ref 1.9–3.5)
GLUCOSE SERPL-MCNC: 111 MG/DL (ref 65–99)
HBA1C MFR BLD: 6.2 % (ref 0–5.6)
HCT VFR BLD AUTO: 44.4 % (ref 42–52)
HGB BLD-MCNC: 14.6 G/DL (ref 14–18)
IMM GRANULOCYTES # BLD AUTO: 0.03 K/UL (ref 0–0.11)
IMM GRANULOCYTES NFR BLD AUTO: 0.5 % (ref 0–0.9)
LYMPHOCYTES # BLD AUTO: 1.35 K/UL (ref 1–4.8)
LYMPHOCYTES NFR BLD: 24.2 % (ref 22–41)
MCH RBC QN AUTO: 31.5 PG (ref 27–33)
MCHC RBC AUTO-ENTMCNC: 32.9 G/DL (ref 33.7–35.3)
MCV RBC AUTO: 95.7 FL (ref 81.4–97.8)
MONOCYTES # BLD AUTO: 0.54 K/UL (ref 0–0.85)
MONOCYTES NFR BLD AUTO: 9.7 % (ref 0–13.4)
NEUTROPHILS # BLD AUTO: 3.55 K/UL (ref 1.82–7.42)
NEUTROPHILS NFR BLD: 63.7 % (ref 44–72)
NRBC # BLD AUTO: 0 K/UL
NRBC BLD-RTO: 0 /100 WBC
PLATELET # BLD AUTO: 197 K/UL (ref 164–446)
PMV BLD AUTO: 11.7 FL (ref 9–12.9)
POTASSIUM SERPL-SCNC: 4 MMOL/L (ref 3.6–5.5)
PROT SERPL-MCNC: 6.4 G/DL (ref 6–8.2)
RBC # BLD AUTO: 4.64 M/UL (ref 4.7–6.1)
SODIUM SERPL-SCNC: 134 MMOL/L (ref 135–145)
TSH SERPL DL<=0.005 MIU/L-ACNC: 2 UIU/ML (ref 0.38–5.33)
WBC # BLD AUTO: 5.6 K/UL (ref 4.8–10.8)

## 2019-02-01 PROCEDURE — 97110 THERAPEUTIC EXERCISES: CPT

## 2019-02-01 PROCEDURE — 84443 ASSAY THYROID STIM HORMONE: CPT

## 2019-02-01 PROCEDURE — 83036 HEMOGLOBIN GLYCOSYLATED A1C: CPT

## 2019-02-01 PROCEDURE — 85025 COMPLETE CBC W/AUTO DIFF WBC: CPT

## 2019-02-01 PROCEDURE — 36415 COLL VENOUS BLD VENIPUNCTURE: CPT

## 2019-02-01 PROCEDURE — 80053 COMPREHEN METABOLIC PANEL: CPT

## 2019-02-01 NOTE — OP THERAPY DAILY TREATMENT
Outpatient Physical Therapy  DAILY TREATMENT     [facility] Therapy [address].  [address]  [place] [address]  Phone:  [phone]  Fax:  [phone]    Date: 02/01/2019    Patient: Primo Abarca II  YOB: 1945  MRN: [id number]     Time Calculation  Start time: 1004  Stop time: 1034 Time Calculation (min): 30 minutes     Chief Complaint: Loss Of Balance    Visit #: 5    SUBJECTIVE:  Pt reports he doing ok, better with sitting and standing at home.    OBJECTIVE:        Therapeutic Exercises (CPT 69410):     1. Supine leg curls with exercise ball, x10    2. LTR with exercise ball, x10    3. Prone rows B, x10    4. Prone Ys, x10i    5. Tandem walking, h25ovcl with CGA    6. Tapping cones ant-med to/from ant-lat, x5 B with CGA    7. Supine chest flye, x10     8. Standing alt R/L UE overhead reach at wall, x7    9. Low trap squeeze at wall, x10    10. Ws at wall, x10    11. NuStep, Level 3, x7 min      Time-based treatments/modalities:  Therapeutic exercise minutes (CPT 97379): 30 minutes       ASSESSMENT:   Response to treatment: Pt reports mild soreness in mid thoracic and scapular area with and following postural exercises.    PLAN/RECOMMENDATIONS:   Plan for treatment: therapy treatment to continue next visit.  Emphasis on thoracic extension/ scapulo-thoracic posture, and dynamic balance.  Planned interventions for next visit: continue with current treatment.

## 2019-02-04 ENCOUNTER — HOSPITAL ENCOUNTER (OUTPATIENT)
Dept: CARDIOLOGY | Facility: MEDICAL CENTER | Age: 74
End: 2019-02-04
Attending: FAMILY MEDICINE
Payer: MEDICARE

## 2019-02-04 DIAGNOSIS — M79.89 LEG SWELLING: ICD-10-CM

## 2019-02-04 LAB
LV EJECT FRACT  99904: 60
LV EJECT FRACT MOD 2C 99903: 61.37
LV EJECT FRACT MOD 4C 99902: 73.01
LV EJECT FRACT MOD BP 99901: 67.89

## 2019-02-04 PROCEDURE — 93306 TTE W/DOPPLER COMPLETE: CPT

## 2019-02-04 PROCEDURE — 93306 TTE W/DOPPLER COMPLETE: CPT | Mod: 26 | Performed by: INTERNAL MEDICINE

## 2019-02-05 ENCOUNTER — PHYSICAL THERAPY (OUTPATIENT)
Dept: PHYSICAL THERAPY | Facility: REHABILITATION | Age: 74
End: 2019-02-05
Attending: PSYCHIATRY & NEUROLOGY
Payer: MEDICARE

## 2019-02-05 DIAGNOSIS — G20.A1 PARKINSON DISEASE: ICD-10-CM

## 2019-02-05 DIAGNOSIS — Z91.81 RISK FOR FALLS: ICD-10-CM

## 2019-02-05 PROCEDURE — 97116 GAIT TRAINING THERAPY: CPT

## 2019-02-05 PROCEDURE — 97110 THERAPEUTIC EXERCISES: CPT

## 2019-02-05 NOTE — OP THERAPY DAILY TREATMENT
"  Outpatient Physical Therapy  DAILY TREATMENT     [facility] Physical [address].  [address]  [place] [address]  Phone:  [phone]  Fax:  [phone]    Date: 02/05/2019    Patient: Primo Abarca II  YOB: 1945  MRN: [id number]     Time Calculation  Start time: 1330  Stop time: 1400 Time Calculation (min): 30 minutes     Chief Complaint: Loss Of Balance    Visit #: 6    SUBJECTIVE:  Feeling good, \"I haven't done anything yet today\".    OBJECTIVE:        Therapeutic Exercises (CPT 39282):     1. Seated UE scaption (start arms crossed low, open up), x10    2. Seated thoracic ext and scap retraction in to exercise ball, x10    3. Seated rows- unsupported with pink tband, x10    4. Hooklying clams, x10 alt R/L with pink tband    5. SKC, 0y41mgp B    6. Bridges, x10    7. Standing HS curls, x10B with BUE support    Therapeutic Treatments and Modalities:     1. Gait Training (CPT 88689)    Therapeutic Treatment and Modalities Summary: Tandem gait on tape on floor  Lateral steps on tape on floor.  Gait with arm swing and feet on ipsilateral sides of tape on floor  Step over 1/2 FR, x10B, with RUE support  Posterior step to tape on floor, alt R/L, x10 with CGA.        Time-based treatments/modalities:  Gait training minutes (CPT 75416): 15 minutes  Therapeutic exercise minutes (CPT 07258): 15 minutes       ASSESSMENT:   Response to treatment: Pt ginger therapy well, benefits from manual and visual cues for posture and form.    PLAN/RECOMMENDATIONS:   Plan for treatment: therapy treatment to continue next visit. Consider functional gait assessment or 6MWT.  Planned interventions for next visit: continue with current treatment.      "

## 2019-02-08 ENCOUNTER — PHYSICAL THERAPY (OUTPATIENT)
Dept: PHYSICAL THERAPY | Facility: REHABILITATION | Age: 74
End: 2019-02-08
Attending: PSYCHIATRY & NEUROLOGY
Payer: MEDICARE

## 2019-02-08 DIAGNOSIS — G20.A1 PARKINSON DISEASE: ICD-10-CM

## 2019-02-08 DIAGNOSIS — Z91.81 RISK FOR FALLS: ICD-10-CM

## 2019-02-08 PROCEDURE — 97110 THERAPEUTIC EXERCISES: CPT

## 2019-02-08 NOTE — OP THERAPY DAILY TREATMENT
"  Outpatient Physical Therapy  DAILY TREATMENT     [facility] Physical [address].  [address]  [place] [address]  Phone:  [phone]  Fax:  [phone]    Date: 02/08/2019    Patient: Primo Abarca II  YOB: 1945  MRN: [id number]     Time Calculation  Start time: 1340  Stop time: 1402 Time Calculation (min): 22 minutes     Chief Complaint: Loss Of Balance    Visit #: 7    SUBJECTIVE:  Feeling ok.  Has not been out walking much this week due to snow has not melted.    OBJECTIVE:        Therapeutic Exercises (CPT 80648):     1. Ws at wall, x10    2. Alt UE OH reach at wall, x5    3. Standing rows with pink tband, 2x10    4. SLR, x10    5. Heel tap to 2\" step, alt R/L x10, with SBA    6. Bridges, x10    7. Sidelying hip abd, x10B    8. Sidelying clams, x10B    9. Supine B shoulder over head flexion, x5    10. Supine snow angels , x5    11. Minisquats with light UE support, x10, with cues for form    12. STS at elevated mat, x6      Therapeutic Exercise Summary: Inadequate terminal knee extension strength in supine and sidelying exercises.    Therapeutic Treatments and Modalities:     Therapeutic Treatment and Modalities Summary:         Time-based treatments/modalities:  Therapeutic exercise minutes (CPT 39200): 22 minutes    ASSESSMENT:   Response to treatment: Difficulty dissociating R and L LE with supine and sidelying exercise.    PLAN/RECOMMENDATIONS:   Plan for treatment: therapy treatment to continue next visit.  Planned interventions for next visit: continue with current treatment.      "

## 2019-02-13 ENCOUNTER — PHYSICAL THERAPY (OUTPATIENT)
Dept: PHYSICAL THERAPY | Facility: REHABILITATION | Age: 74
End: 2019-02-13
Attending: PSYCHIATRY & NEUROLOGY
Payer: MEDICARE

## 2019-02-13 DIAGNOSIS — G20.A1 PARKINSON DISEASE: ICD-10-CM

## 2019-02-13 DIAGNOSIS — Z91.81 RISK FOR FALLS: ICD-10-CM

## 2019-02-13 PROCEDURE — 97116 GAIT TRAINING THERAPY: CPT

## 2019-02-13 PROCEDURE — 97110 THERAPEUTIC EXERCISES: CPT

## 2019-02-13 NOTE — OP THERAPY DAILY TREATMENT
Outpatient Physical Therapy  DAILY TREATMENT     [facility] Physical [address].  [address]  [place] [address]  Phone:  [phone]  Fax:  [phone]    Date: 02/13/2019    Patient: Primo Abarca II  YOB: 1945  MRN: [id number]     Time Calculation  Start time: 1334  Stop time: 1402 Time Calculation (min): 28 minutes     Chief Complaint: Loss Of Balance    Visit #: 8    SUBJECTIVE:  Nothing new to report.    OBJECTIVE:        Therapeutic Exercises (CPT 18991):     1. STS no UEs, x5    2. Seated march, BIG, x10    3. Seated ankle DF, x10B    4. Standing hip ext with white tband, x10B with BUE support    5. Standing heel raises, x10 with BUE support    6. B scaption (wrists crossed low, then open wide and up), x10    7. Scap retraction and shoulder circles, difficulty coordinating scapular movement- compensates with spine flexion    8. Rows with pink tband, x15    9. Shoulder B ER, x10    10. Alt UE OH reach against 1/2 FR at wall, x10    11. NuStep, x5 min    Therapeutic Treatments and Modalities:     1. Gait Training (CPT 14592)    Therapeutic Treatment and Modalities Summary: Arm swing in stride stance, x10B  Gait with arm swing, 20' x4  Static unsupported balance in stride stance, 2x 15 sec B  Step tap to cones anterior (med and lat) x5 B with CGA- SBA.  Lateral step tap to cones, alt R/L, x10 with SBA  Posterior step tap to cone, alt R/L, x10 with CGA-SBA.          Time-based treatments/modalities:  Gait training minutes (CPT 71362): 13 minutes  Therapeutic exercise minutes (CPT 49704): 15 minutes       ASSESSMENT:   Response to treatment: Improved arm swing.    PLAN/RECOMMENDATIONS:   Plan for treatment: therapy treatment to continue next visit.  Re-assess TUG.  Planned interventions for next visit: continue with current treatment.

## 2019-02-15 ENCOUNTER — APPOINTMENT (OUTPATIENT)
Dept: PHYSICAL THERAPY | Facility: REHABILITATION | Age: 74
End: 2019-02-15
Attending: PSYCHIATRY & NEUROLOGY
Payer: MEDICARE

## 2019-02-20 ENCOUNTER — PHYSICAL THERAPY (OUTPATIENT)
Dept: PHYSICAL THERAPY | Facility: REHABILITATION | Age: 74
End: 2019-02-20
Attending: PSYCHIATRY & NEUROLOGY
Payer: MEDICARE

## 2019-02-20 DIAGNOSIS — G20.A1 PARKINSON DISEASE: ICD-10-CM

## 2019-02-20 DIAGNOSIS — Z91.81 RISK FOR FALLS: ICD-10-CM

## 2019-02-20 PROCEDURE — 97110 THERAPEUTIC EXERCISES: CPT

## 2019-02-20 PROCEDURE — 97112 NEUROMUSCULAR REEDUCATION: CPT

## 2019-02-20 NOTE — OP THERAPY DAILY TREATMENT
"  Outpatient Physical Therapy  DAILY TREATMENT     [facility] Physical [address].  [address]  [place] [address]  Phone:  [phone]  Fax:  [phone]    Date: 02/20/2019    Patient: Primo Abarca II  YOB: 1945  MRN: [id number]     Time Calculation  Start time: 1335  Stop time: 1401 Time Calculation (min): 26 minutes     Chief Complaint: Loss Of Balance    Visit #: 9    SUBJECTIVE:  Pt reports no change.    OBJECTIVE:  Current objective measures:  PT Functional Assessment Tool Used: TUG test  PT Functional Assessment Score: 16.3 sec       Therapeutic Exercises (CPT 96531):     1. Step back in place, x10B with BUE support    2. Wall slides (B shoulder flexion), x10 for thoracic extension and scapular mobility    3. Standing toe taps, x10B with BUE support    4. Standing heel raises, x10B with BUE support    5. Minisquats, x10 with BUE support    7. Ws against 1/2 FR at wall, x10    8. Rows with pink tband, x15    9. Shoulder B ext with pink tband, x10    10. Alt UE OH reach against 1/2 FR at wall, x10    Therapeutic Treatments and Modalities:     1. Neuromuscular Re-education (CPT 26100)    Therapeutic Treatment and Modalities Summary: Lateral gait at tape on floor- 10' x3 with SBA/SPV.  Pt demo mild-mod path deviation.  Stride stance unsupported balance on Airex, 30sec B.  Step tap to Airex, alt R/L.  x10 with LUE support, x10 without UE support, with SBA.  Step up, 6\", x10B with LUE support.          Time-based treatments/modalities:  Therapeutic exercise minutes (CPT 43021): 16 minutes  Neuromusc re-ed, balance, coor, post minutes (CPT 97993): 10 minutes       ASSESSMENT:   Response to treatment: Good ginger for therex and standing activity.    PLAN/RECOMMENDATIONS:   Plan for treatment: therapy treatment to continue next visit.  Planned interventions for next visit: continue with current treatment.      "

## 2019-02-22 ENCOUNTER — PHYSICAL THERAPY (OUTPATIENT)
Dept: PHYSICAL THERAPY | Facility: REHABILITATION | Age: 74
End: 2019-02-22
Attending: PSYCHIATRY & NEUROLOGY
Payer: MEDICARE

## 2019-02-22 DIAGNOSIS — G20.A1 PARKINSON DISEASE: ICD-10-CM

## 2019-02-22 DIAGNOSIS — Z91.81 RISK FOR FALLS: ICD-10-CM

## 2019-02-22 PROCEDURE — 97110 THERAPEUTIC EXERCISES: CPT

## 2019-02-22 NOTE — OP THERAPY DAILY TREATMENT
Outpatient Physical Therapy  DAILY TREATMENT     [facility] Physical [address].  [address]  [place] [address]  Phone:  [phone]  Fax:  [phone]    Date: 02/22/2019    Patient: Primo Abarca II  YOB: 1945  MRN: [id number]     Time Calculation  Start time: 1335  Stop time: 1400 Time Calculation (min): 25 minutes     Chief Complaint: Loss Of Balance    Visit #: 10    SUBJECTIVE:  No falls recently.    OBJECTIVE:        Therapeutic Exercises (CPT 70918):     1. Posterior lunge with BUE support, x10B    2. Wall slides (B shoulder flexion), x10 for thoracic extension and scapular mobility    3. B shoulder ER, with pink tband x15    4. Standing heel raises, x10B     5. Squats, x10 with light BUE support    6. Nustep level 3, x5 min    7. Ws against 1/2 FR at wall, x10    8. Rows with pink tband, x15 alt R/L with trunk rotation in stride stance    9. Lateral lunge, x10    10. Alt UE OH reach against 1/2 FR at wall, x10    11. Alt heel taps to tape, x10    Therapeutic Treatments and Modalities:     Therapeutic Treatment and Modalities Summary:           Time-based treatments/modalities:  Therapeutic exercise minutes (CPT 43311): 25 minutes       ASSESSMENT:   Response to treatment: Good progression in standing and balance tolerance.    PLAN/RECOMMENDATIONS:   Plan for treatment: therapy treatment to continue next visit.  Planned interventions for next visit: continue with current treatment.

## 2019-02-27 ENCOUNTER — PHYSICAL THERAPY (OUTPATIENT)
Dept: PHYSICAL THERAPY | Facility: REHABILITATION | Age: 74
End: 2019-02-27
Attending: PSYCHIATRY & NEUROLOGY
Payer: MEDICARE

## 2019-02-27 DIAGNOSIS — Z91.81 RISK FOR FALLS: ICD-10-CM

## 2019-02-27 DIAGNOSIS — G20.A1 PARKINSON DISEASE: ICD-10-CM

## 2019-02-27 PROCEDURE — 97116 GAIT TRAINING THERAPY: CPT

## 2019-02-27 PROCEDURE — 97110 THERAPEUTIC EXERCISES: CPT

## 2019-02-27 NOTE — OP THERAPY DAILY TREATMENT
"  Outpatient Physical Therapy  DAILY TREATMENT     [facility] Physical [address].  [address]  [place] [address]  Phone:  [phone]  Fax:  [phone]    Date: 02/27/2019    Patient: Primo Abarca II  YOB: 1945  MRN: [id number]     Time Calculation  Start time: 1333  Stop time: 1405 Time Calculation (min): 32 minutes     Chief Complaint: Loss Of Balance    Visit #: 11    SUBJECTIVE:  Pt reports he \"took at header\" getting up from a chair at home.  Has not been able to take walks lately due to snow.  Does some exercises at home, but reports he needs to do more.    OBJECTIVE:        Therapeutic Exercises (CPT 53590):     1. Posterior lunge with RUE support, x10B    2. Wall slides (alt shoulder flexion), x10 for thoracic extension and scapular mobility    3. LTR with exercise ball, with pink tband x15    4. Standing heel raises, x10B     6. Supine leg curl with exercise ball, x10    7. Ws at wall, x10    8. Seated D2 with hands clasped, x10B    9. Supine chest flye, x10    11. Standing toe taps, alt R/L, x10    Therapeutic Treatments and Modalities:     1. Gait Training (CPT 04786)    Therapeutic Treatment and Modalities Summary: Lateral stepping, 20' x2 B  Arm swings in stride stance, x10B  Step over wooden block, x10B with SBA.  Standing hip abd, x10 with RUE support  Standing hip ext, x10 with RUE support.        Time-based treatments/modalities:  Gait training minutes (CPT 69586): 15 minutes  Therapeutic exercise minutes (CPT 11334): 15 minutes       ASSESSMENT:   Response to treatment: Fair ginger for standing and balance activities.    PLAN/RECOMMENDATIONS:   Plan for treatment: therapy treatment to continue next visit.  Planned interventions for next visit: continue with current treatment.      "

## 2019-03-01 ENCOUNTER — PHYSICAL THERAPY (OUTPATIENT)
Dept: PHYSICAL THERAPY | Facility: REHABILITATION | Age: 74
End: 2019-03-01
Attending: PSYCHIATRY & NEUROLOGY
Payer: MEDICARE

## 2019-03-01 DIAGNOSIS — Z91.81 RISK FOR FALLS: ICD-10-CM

## 2019-03-01 DIAGNOSIS — G20.A1 PARKINSON DISEASE: ICD-10-CM

## 2019-03-01 PROCEDURE — 97112 NEUROMUSCULAR REEDUCATION: CPT

## 2019-03-01 PROCEDURE — 97110 THERAPEUTIC EXERCISES: CPT

## 2019-03-01 NOTE — OP THERAPY DAILY TREATMENT
Outpatient Physical Therapy  DAILY TREATMENT     [facility] Physical [address]  [address]  [place] [address]  Phone:  [phone]  Fax:  [phone]    Date: 03/01/2019    Patient: Primo Abarca II  YOB: 1945  MRN: [id number]     Time Calculation  Start time: 1332  Stop time: 1401 Time Calculation (min): 29 minutes     Chief Complaint: Loss Of Balance    Visit #: 12    SUBJECTIVE:  Nothing to report.    OBJECTIVE:        Therapeutic Exercises (CPT 16802):     1. Wall squats, x10    2. Wall slides (BUEs), x10 for thoracic extension and scapular mobility    3. LTR with exercise ball, x10    4. Sidelying hip abd, x10B     5. Bridges, 2x10    7. Ws at wall, x10    8. Standing D2 with hands clasped in stride stance, x10B    9. Supine chest flye, 2x10    Therapeutic Treatments and Modalities:     1. Neuromuscular Re-education (CPT 80818)    Therapeutic Treatment and Modalities Summary: Alt lat step, x10.  Arm swings in stride stance, x10B  Alt forward heel tap, x10 with SBA.  Tandem stance- static unsupported balance x30 sec with light CGA prn.  Stride stance balance on Airex, x30sec with SBA.  Narrow stance balance on Airex x30 sec with SBA.            Time-based treatments/modalities:  Therapeutic exercise minutes (CPT 28981): 13 minutes  Neuromusc re-ed, balance, coor, post minutes (CPT 82119): 15 minutes       ASSESSMENT:   Response to treatment: Improved static balance, decreased falls per pt report, improved postural awareness.    PLAN/RECOMMENDATIONS:   Plan for treatment: therapy treatment to continue next visit.  Planned interventions for next visit: continue with current treatment.

## 2019-03-04 ENCOUNTER — PHYSICAL THERAPY (OUTPATIENT)
Dept: PHYSICAL THERAPY | Facility: REHABILITATION | Age: 74
End: 2019-03-04
Attending: PSYCHIATRY & NEUROLOGY
Payer: MEDICARE

## 2019-03-04 DIAGNOSIS — G20.A1 PARKINSON DISEASE: ICD-10-CM

## 2019-03-04 PROCEDURE — 97112 NEUROMUSCULAR REEDUCATION: CPT

## 2019-03-04 NOTE — OP THERAPY DAILY TREATMENT
Outpatient Physical Therapy  DAILY TREATMENT     [facility] Physical [address].  [address]  [place] [address]  Phone:  [phone]  Fax:  [phone]    Date: 03/04/2019    Patient: Primo Abarca II  YOB: 1945  MRN: [id number]     Time Calculation  Start time: 1330  Stop time: 1400 Time Calculation (min): 30 minutes     Chief Complaint: Loss Of Balance    Visit #: 13    SUBJECTIVE:  Pt feels things are a little bit better but can't articulate how. Still feels balance is a big issue.    OBJECTIVE:        Therapeutic Treatments and Modalities:     1. Neuromuscular Re-education (CPT 97805)    Therapeutic Treatment and Modalities Summary:     1. 1/2 foam heel/toe rocking, x10    2. 1/2 foam DL balance , x2 min, alt UE holds    3. 1/2 foam tandem balance, x1 min ea, alt UE holds    4. Step over & back (1/2 foam), x10 ea    5. Tandem walk fwd w/cone taps, x5 min    6. Rows w/pink TB, x20    Time-based treatments/modalities:  Neuromusc re-ed, balance, coor, post minutes (CPT 28708): 30 minutes       ASSESSMENT:   Pt required consistent cues for upright trunk during ther ex/balance. Tolerated increase challenge to balance well.    PLAN/RECOMMENDATIONS:   Plan for treatment: therapy treatment to continue next visit.  Planned interventions for next visit: continue with current treatment.

## 2019-03-06 ENCOUNTER — PHYSICAL THERAPY (OUTPATIENT)
Dept: PHYSICAL THERAPY | Facility: REHABILITATION | Age: 74
End: 2019-03-06
Attending: PSYCHIATRY & NEUROLOGY
Payer: MEDICARE

## 2019-03-06 DIAGNOSIS — Z91.81 RISK FOR FALLS: ICD-10-CM

## 2019-03-06 DIAGNOSIS — G20.A1 PARKINSON DISEASE: ICD-10-CM

## 2019-03-06 PROCEDURE — 97112 NEUROMUSCULAR REEDUCATION: CPT

## 2019-03-06 NOTE — OP THERAPY DAILY TREATMENT
Outpatient Physical Therapy  DAILY TREATMENT     [facility] Physical [address].  [address]  [place] [address]  Phone:  [phone]  Fax:  [phone]    Date: 03/06/2019    Patient: Primo Abarca II  YOB: 1945  MRN: [id number]     Time Calculation  Start time: 1330  Stop time: 1400 Time Calculation (min): 30 minutes     Chief Complaint: Loss Of Balance and Weakness    Visit #: 14    SUBJECTIVE:  Noticed increased fatigue after session on Monday. Reports feeling sleepy while driving and drifting L into another shanae. Reports family has always been concerned about his ability to stay awake for longer drives at the wheel    OBJECTIVE:        Therapeutic Treatments and Modalities:     1. Neuromuscular Re-education (CPT 93416)    Therapeutic Treatment and Modalities Summary:     1. 1/2 foam heel/toe rocking, x10    2. 1/2 foam DL balance , x2 min, alt UE holds    3. 1/2 foam tandem balance, x1 min ea, alt UE holds (able to hold briefly w/o either UE)    4. Bridging x20    5. Step over & back (1/2 foam), x10 ea    5. Standing on foam: rows w/pink TB, x20, B OH reach w/yellow ball, trunk rot w/ball outstretched x10        Time-based treatments/modalities:  Neuromusc re-ed, balance, coor, post minutes (CPT 44618): 30 minutes       ASSESSMENT:   Pt tolerated session well, able to maintain eye contact throughout stepping exercise w/cuing.    PLAN/RECOMMENDATIONS:   Plan for treatment: therapy treatment to continue next visit.  Planned interventions for next visit: continue with current treatment.

## 2019-03-13 ENCOUNTER — PHYSICAL THERAPY (OUTPATIENT)
Dept: PHYSICAL THERAPY | Facility: REHABILITATION | Age: 74
End: 2019-03-13
Attending: PSYCHIATRY & NEUROLOGY
Payer: MEDICARE

## 2019-03-13 DIAGNOSIS — G20.A1 PARKINSON DISEASE: ICD-10-CM

## 2019-03-13 DIAGNOSIS — Z91.81 RISK FOR FALLS: ICD-10-CM

## 2019-03-13 PROCEDURE — 97110 THERAPEUTIC EXERCISES: CPT

## 2019-03-13 PROCEDURE — 97112 NEUROMUSCULAR REEDUCATION: CPT

## 2019-03-13 NOTE — OP THERAPY DAILY TREATMENT
Outpatient Physical Therapy  DAILY TREATMENT     [facility] Physical [address].  [address]  [place] [address]  Phone:  [phone]  Fax:  [phone]    Date: 03/13/2019    Patient: Primo Abarca II  YOB: 1945  MRN: [id number]     Time Calculation  Start time: 1334  Stop time: 1401 Time Calculation (min): 27 minutes     Chief Complaint: Loss Of Balance    Visit #: 15    SUBJECTIVE:  States balance is still his biggest impairment.    OBJECTIVE:          Therapeutic Exercises (CPT 06802):     1. NuStep, x5 min, Level 3    2. Bridges, x15    3. Supine alt UE OH reach, x10    4. Standing hip ext with opp UE OH reach, x10 with single UE support    Therapeutic Treatments and Modalities:     1. Neuromuscular Re-education (CPT 78256)    Therapeutic Treatment and Modalities Summary:     1. 1/2 foam heel/toe rocking, x10    2. 1/2 foam tandem balance, x1 min ea, alt UE holds (able to hold briefly w/o either UE)    3. Step over & back (1/2 foam), x10 ea    4. Standing on foam: rows w/pink TB, x15, and in stride stance x10B.            Time-based treatments/modalities:  Therapeutic exercise minutes (CPT 50372): 13 minutes  Neuromusc re-ed, balance, coor, post minutes (CPT 28679): 14 minutes      ASSESSMENT:   Response to treatment: Fair dynamic balance.    PLAN/RECOMMENDATIONS:   Plan for treatment: therapy treatment to continue next visit.  DC after next visit.  RE-assessment and review HEP and goals.  Planned interventions for next visit: continue with current treatment.

## 2019-03-15 DIAGNOSIS — R60.0 LOCALIZED EDEMA: ICD-10-CM

## 2019-03-15 RX ORDER — POTASSIUM CHLORIDE 600 MG/1
CAPSULE, EXTENDED RELEASE ORAL
Qty: 200 CAP | Refills: 0 | Status: SHIPPED | OUTPATIENT
Start: 2019-03-15 | End: 2019-05-28 | Stop reason: SDUPTHER

## 2019-03-19 ENCOUNTER — PHYSICAL THERAPY (OUTPATIENT)
Dept: PHYSICAL THERAPY | Facility: REHABILITATION | Age: 74
End: 2019-03-19
Attending: PSYCHIATRY & NEUROLOGY
Payer: MEDICARE

## 2019-03-19 DIAGNOSIS — G20.A1 PARKINSON DISEASE: ICD-10-CM

## 2019-03-19 DIAGNOSIS — Z91.81 RISK FOR FALLS: ICD-10-CM

## 2019-03-19 PROCEDURE — 97110 THERAPEUTIC EXERCISES: CPT

## 2019-03-19 PROCEDURE — 97112 NEUROMUSCULAR REEDUCATION: CPT

## 2019-03-19 NOTE — OP THERAPY DAILY TREATMENT
Outpatient Physical Therapy  DAILY TREATMENT     [facility] Physical [address].  [address]  [place] [address]  Phone:  [phone]  Fax:  [phone]    Date: 03/19/2019    Patient: Primo Abarca II  YOB: 1945  MRN: [id number]     Time Calculation  Start time: 1035  Stop time: 1102 Time Calculation (min): 27 minutes     Chief Complaint: Loss Of Balance    Visit #: 16    SUBJECTIVE:  Pt reports he falls less and has more strength in STS.  Feels like he can continue HEP and walking independently at home.    OBJECTIVE:  Current objective measures: TUG= 13.33sec.          Therapeutic Exercises (CPT 37768):     1. NuStep, x6 min, Level 3    2. Arm slides at wall, x10    3. Seated BIG march, x10    4. Ws at wall, x10    5. Seated heel/toe raises, x10    6. Seated arms crossed to arms open with thoracic ext, x10    7. STS, no UE, 6    8. Standing hip ext, x10B    Therapeutic Treatments and Modalities:     1. Neuromuscular Re-education (CPT 07202)    Therapeutic Treatment and Modalities Summary: Step over and back over 1/2 FR, x10B, with SBA.  Rows x15 and arm swings x10B with pink tband, standing on [employer] (stride stance for arm swing)   TUG- 3 trials, 14, 14, and 12 seconds. (Avg= 13.33 sec).            Time-based treatments/modalities:  Therapeutic exercise minutes (CPT 48354): 15 minutes  Neuromusc re-ed, balance, coor, post minutes (CPT 51668): 12 minutes       ASSESSMENT:   Response to treatment: Good ginger for treatment.  Increased awareness and correction of posture and balance.    PLAN/RECOMMENDATIONS:   Plan for treatment: no further treatment needed.  DC PT, continue HEP.

## 2019-03-19 NOTE — OP THERAPY DISCHARGE SUMMARY
Outpatient Physical Therapy  DISCHARGE SUMMARY NOTE      [facility] Physical Therapy [address].  [address]  [place] [address]  Phone:  [phone]  Fax:  [phone]    Date of Visit: 03/19/2019    Patient: Primo Abarca II  YOB: 1945  MRN: [id number]     Referring Provider: Dudley Sotelo M.D.  [address] [place] [address], [place] [address]   Referring Diagnosis Parkinson's disease [G20];History of falling [Z91.81]     Physical Therapy Occurrence Codes    Date of onset of impairment:  1/15/19   Date physical therapy care plan established or reviewed:  1/15/19   Date physical therapy treatment started:  1/15/19          Functional Limitations and Severity Modifiers      Goal:     Discharge:  TUG= 13.33sec       Your patient is being discharged from Physical Therapy with the following comments:   · Progress plateau    Comments:  Improved activity tolerance and balance.     Recommendations:  Continue HEP.    Jennie Javed, PT    Date: 3/19/2019

## 2019-03-23 DIAGNOSIS — R60.0 LOCALIZED EDEMA: ICD-10-CM

## 2019-03-24 RX ORDER — FUROSEMIDE 20 MG/1
TABLET ORAL
Qty: 90 TAB | Refills: 0 | Status: SHIPPED | OUTPATIENT
Start: 2019-03-24

## 2019-05-28 DIAGNOSIS — R60.0 LOCALIZED EDEMA: ICD-10-CM

## 2019-05-28 RX ORDER — FUROSEMIDE 20 MG/1
20 TABLET ORAL DAILY
Qty: 90 TAB | Refills: 0 | Status: SHIPPED | OUTPATIENT
Start: 2019-05-28 | End: 2019-09-25 | Stop reason: SDUPTHER

## 2019-05-28 RX ORDER — CARBIDOPA/LEVODOPA 25MG-250MG
1 TABLET ORAL 3 TIMES DAILY
Qty: 270 TAB | Refills: 1 | Status: SHIPPED | OUTPATIENT
Start: 2019-05-28 | End: 2020-01-05

## 2019-05-28 RX ORDER — POTASSIUM CHLORIDE 600 MG/1
CAPSULE, EXTENDED RELEASE ORAL
Qty: 200 CAP | Refills: 0 | Status: SHIPPED | OUTPATIENT
Start: 2019-05-28 | End: 2021-02-16 | Stop reason: SDUPTHER

## 2019-07-30 ENCOUNTER — PATIENT OUTREACH (OUTPATIENT)
Dept: HEALTH INFORMATION MANAGEMENT | Facility: OTHER | Age: 74
End: 2019-07-30

## 2019-07-30 NOTE — PROGRESS NOTES
Outcome: No answer/ No VM   Added email to [employer]     Please transfer to Patient Outreach Team at [phone] when patient returns call.    [facility] Verified: yes    Attempt # 1

## 2019-09-25 DIAGNOSIS — R60.0 LOCALIZED EDEMA: ICD-10-CM

## 2019-09-25 RX ORDER — FUROSEMIDE 20 MG/1
TABLET ORAL
Qty: 30 TAB | Refills: 2 | Status: SHIPPED | OUTPATIENT
Start: 2019-09-25

## 2019-09-25 RX ORDER — POTASSIUM CHLORIDE 600 MG/1
CAPSULE, EXTENDED RELEASE ORAL
Qty: 180 CAP | Refills: 0 | Status: SHIPPED | OUTPATIENT
Start: 2019-09-25 | End: 2019-12-30

## 2019-12-26 DIAGNOSIS — R60.0 LOCALIZED EDEMA: ICD-10-CM

## 2019-12-26 RX ORDER — POTASSIUM CHLORIDE 600 MG/1
CAPSULE, EXTENDED RELEASE ORAL
Qty: 180 CAP | Refills: 0 | Status: CANCELLED | OUTPATIENT
Start: 2019-12-26

## 2019-12-30 RX ORDER — POTASSIUM CHLORIDE 600 MG/1
CAPSULE, EXTENDED RELEASE ORAL
Qty: 200 CAP | Refills: 0 | Status: SHIPPED | OUTPATIENT
Start: 2019-12-30 | End: 2020-07-23

## 2020-01-05 RX ORDER — CARBIDOPA/LEVODOPA 25MG-250MG
TABLET ORAL
Qty: 270 TAB | Refills: 1 | Status: SHIPPED | OUTPATIENT
Start: 2020-01-05

## 2020-07-23 DIAGNOSIS — R60.0 LOCALIZED EDEMA: ICD-10-CM

## 2020-07-23 RX ORDER — POTASSIUM CHLORIDE 600 MG/1
CAPSULE, EXTENDED RELEASE ORAL
Qty: 200 CAP | Refills: 0 | Status: SHIPPED | OUTPATIENT
Start: 2020-07-23 | End: 2020-11-05

## 2020-11-04 DIAGNOSIS — R60.0 LOCALIZED EDEMA: ICD-10-CM

## 2020-11-05 RX ORDER — POTASSIUM CHLORIDE 600 MG/1
CAPSULE, EXTENDED RELEASE ORAL
Qty: 200 CAP | Refills: 0 | Status: SHIPPED | OUTPATIENT
Start: 2020-11-05 | End: 2021-02-16 | Stop reason: SDUPTHER

## 2021-01-11 DIAGNOSIS — Z23 NEED FOR VACCINATION: ICD-10-CM

## 2021-02-16 DIAGNOSIS — R60.0 LOCALIZED EDEMA: ICD-10-CM

## 2021-02-16 RX ORDER — POTASSIUM CHLORIDE 600 MG/1
CAPSULE, EXTENDED RELEASE ORAL
Qty: 200 CAPSULE | Refills: 0 | Status: SHIPPED | OUTPATIENT
Start: 2021-02-16 | End: 2021-02-18 | Stop reason: SDUPTHER

## 2021-02-16 RX ORDER — POTASSIUM CHLORIDE 600 MG/1
CAPSULE, EXTENDED RELEASE ORAL
Qty: 200 CAPSULE | Refills: 0 | Status: SHIPPED | OUTPATIENT
Start: 2021-02-16

## 2021-02-18 DIAGNOSIS — R60.0 LOCALIZED EDEMA: ICD-10-CM

## 2021-02-18 RX ORDER — POTASSIUM CHLORIDE 600 MG/1
CAPSULE, EXTENDED RELEASE ORAL
Qty: 200 CAPSULE | Refills: 0 | Status: SHIPPED | OUTPATIENT
Start: 2021-02-18

## 2024-01-01 ENCOUNTER — TELEPHONE (OUTPATIENT)
Dept: HEALTH INFORMATION MANAGEMENT | Facility: OTHER | Age: 79
End: 2024-01-01